# Patient Record
Sex: FEMALE | Race: WHITE | NOT HISPANIC OR LATINO | URBAN - METROPOLITAN AREA
[De-identification: names, ages, dates, MRNs, and addresses within clinical notes are randomized per-mention and may not be internally consistent; named-entity substitution may affect disease eponyms.]

---

## 2018-11-29 ENCOUNTER — EMERGENCY (EMERGENCY)
Facility: HOSPITAL | Age: 48
LOS: 0 days | Discharge: HOME | End: 2018-11-30
Attending: EMERGENCY MEDICINE | Admitting: EMERGENCY MEDICINE
Payer: COMMERCIAL

## 2018-11-29 VITALS
RESPIRATION RATE: 20 BRPM | TEMPERATURE: 98 F | SYSTOLIC BLOOD PRESSURE: 123 MMHG | HEART RATE: 69 BPM | DIASTOLIC BLOOD PRESSURE: 75 MMHG | WEIGHT: 130.07 LBS | OXYGEN SATURATION: 100 % | HEIGHT: 65 IN
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
## 2018-11-29 DIAGNOSIS — Z98.890 OTHER SPECIFIED POSTPROCEDURAL STATES: Chronic | ICD-10-CM
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
## 2018-11-29 DIAGNOSIS — T54.2X1A TOXIC EFFECT OF CORROSIVE ACIDS AND ACID-LIKE SUBSTANCES, ACCIDENTAL (UNINTENTIONAL), INITIAL ENCOUNTER: ICD-10-CM
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
## 2018-11-29 DIAGNOSIS — R07.9 CHEST PAIN, UNSPECIFIED: ICD-10-CM

## 2018-11-29 LAB
ALBUMIN SERPL ELPH-MCNC: 3.5 G/DL — SIGNIFICANT CHANGE UP (ref 3.3–5)
ALP SERPL-CCNC: 39 U/L — LOW (ref 40–120)
ALT FLD-CCNC: 24 U/L — SIGNIFICANT CHANGE UP (ref 12–78)
ANION GAP SERPL CALC-SCNC: 9 MMOL/L — SIGNIFICANT CHANGE UP (ref 5–17)
APTT BLD: 27.7 SEC — SIGNIFICANT CHANGE UP (ref 27.5–36.3)
AST SERPL-CCNC: 28 U/L — SIGNIFICANT CHANGE UP (ref 15–37)
BILIRUB SERPL-MCNC: 0.4 MG/DL — SIGNIFICANT CHANGE UP (ref 0.2–1.2)
BUN SERPL-MCNC: 12 MG/DL — SIGNIFICANT CHANGE UP (ref 7–23)
CALCIUM SERPL-MCNC: 8.6 MG/DL — SIGNIFICANT CHANGE UP (ref 8.5–10.1)
CHLORIDE SERPL-SCNC: 106 MMOL/L — SIGNIFICANT CHANGE UP (ref 96–108)
CK SERPL-CCNC: 174 U/L — SIGNIFICANT CHANGE UP (ref 26–192)
CO2 SERPL-SCNC: 25 MMOL/L — SIGNIFICANT CHANGE UP (ref 22–31)
CREAT SERPL-MCNC: 1.05 MG/DL — SIGNIFICANT CHANGE UP (ref 0.5–1.3)
D DIMER BLD IA.RAPID-MCNC: 200 NG/ML DDU — SIGNIFICANT CHANGE UP
GLUCOSE SERPL-MCNC: 101 MG/DL — HIGH (ref 70–99)
HCT VFR BLD CALC: 35.8 % — SIGNIFICANT CHANGE UP (ref 34.5–45)
HGB BLD-MCNC: 11.8 G/DL — SIGNIFICANT CHANGE UP (ref 11.5–15.5)
INR BLD: 1.05 RATIO — SIGNIFICANT CHANGE UP (ref 0.88–1.16)
LIDOCAIN IGE QN: 483 U/L — HIGH (ref 73–393)
MAGNESIUM SERPL-MCNC: 2 MG/DL — SIGNIFICANT CHANGE UP (ref 1.6–2.6)
MCHC RBC-ENTMCNC: 28.2 PG — SIGNIFICANT CHANGE UP (ref 27–34)
MCHC RBC-ENTMCNC: 33 GM/DL — SIGNIFICANT CHANGE UP (ref 32–36)
MCV RBC AUTO: 85.6 FL — SIGNIFICANT CHANGE UP (ref 80–100)
NRBC # BLD: 0 /100 WBCS — SIGNIFICANT CHANGE UP (ref 0–0)
PLATELET # BLD AUTO: 223 K/UL — SIGNIFICANT CHANGE UP (ref 150–400)
POTASSIUM SERPL-MCNC: 3.6 MMOL/L — SIGNIFICANT CHANGE UP (ref 3.5–5.3)
POTASSIUM SERPL-SCNC: 3.6 MMOL/L — SIGNIFICANT CHANGE UP (ref 3.5–5.3)
PROT SERPL-MCNC: 6.8 GM/DL — SIGNIFICANT CHANGE UP (ref 6–8.3)
PROTHROM AB SERPL-ACNC: 11.7 SEC — SIGNIFICANT CHANGE UP (ref 10–12.9)
RBC # BLD: 4.18 M/UL — SIGNIFICANT CHANGE UP (ref 3.8–5.2)
RBC # FLD: 12.6 % — SIGNIFICANT CHANGE UP (ref 10.3–14.5)
SODIUM SERPL-SCNC: 140 MMOL/L — SIGNIFICANT CHANGE UP (ref 135–145)
TROPONIN I SERPL-MCNC: <0.015 NG/ML — SIGNIFICANT CHANGE UP (ref 0.01–0.04)
WBC # BLD: 5.95 K/UL — SIGNIFICANT CHANGE UP (ref 3.8–10.5)
WBC # FLD AUTO: 5.95 K/UL — SIGNIFICANT CHANGE UP (ref 3.8–10.5)

## 2018-11-29 PROCEDURE — 99284 EMERGENCY DEPT VISIT MOD MDM: CPT | Mod: 25

## 2018-11-29 PROCEDURE — 93010 ELECTROCARDIOGRAM REPORT: CPT

## 2018-11-29 PROCEDURE — 71045 X-RAY EXAM CHEST 1 VIEW: CPT | Mod: 26

## 2018-11-29 RX ORDER — ASPIRIN/CALCIUM CARB/MAGNESIUM 324 MG
325 TABLET ORAL ONCE
Qty: 0 | Refills: 0 | Status: COMPLETED | OUTPATIENT
Start: 2018-11-29 | End: 2018-11-29

## 2018-11-29 RX ORDER — SODIUM CHLORIDE 9 MG/ML
2000 INJECTION INTRAMUSCULAR; INTRAVENOUS; SUBCUTANEOUS ONCE
Qty: 0 | Refills: 0 | Status: COMPLETED | OUTPATIENT
Start: 2018-11-29 | End: 2018-11-29

## 2018-11-29 RX ORDER — IPRATROPIUM/ALBUTEROL SULFATE 18-103MCG
3 AEROSOL WITH ADAPTER (GRAM) INHALATION ONCE
Qty: 0 | Refills: 0 | Status: COMPLETED | OUTPATIENT
Start: 2018-11-29 | End: 2018-11-29

## 2018-11-29 RX ADMIN — Medication 3 MILLILITER(S): at 23:31

## 2018-11-29 RX ADMIN — Medication 325 MILLIGRAM(S): at 23:23

## 2018-11-29 NOTE — ED ADULT NURSE NOTE - OBJECTIVE STATEMENT
Pt presents to ED complaining of burning in chest after inhaling sulfuric acid cleaning product at approx. 7:30pm.  Pt denied chest pain after inhalation but as night progressed she states her chest became tight.  Denies difficulty breathing, nonproductive cough noted.

## 2018-11-29 NOTE — ED ADULT NURSE NOTE - NSIMPLEMENTINTERV_GEN_ALL_ED
Implemented All Universal Safety Interventions:  [place] to call system. Call bell, personal items and telephone within reach. Instruct patient to call for assistance. Room bathroom lighting operational. Non-slip footwear when patient is off stretcher. Physically safe environment: no spills, clutter or unnecessary equipment. Stretcher in lowest position, wheels locked, appropriate side rails in place.

## 2018-11-29 NOTE — ED PROVIDER NOTE - MEDICAL DECISION MAKING DETAILS
Likely inhalation injury.  Will send labs and give aspirin, get EKG and give duoneb,  If labs negative, pt. can likely go home to f/u with PMD in 1-2 days.

## 2018-11-29 NOTE — ED PROVIDER NOTE - OBJECTIVE STATEMENT
Pt. is a 47 yo F cleaning bottle of sulfonic acid; sneezed, now burning in mid chest. Pt. is a 47 yo F with hx of knee surgery, cardiac arrythmia years ago presents after cleaning outside of bottle of sulfamic acid (Peng ETCH drylock) which caused sneezing and chest pain.  This chemical was used earlier in her basement to clean off the walls. Accidentally used same wet tissue that cleaned outside of bottle to wipe and blow her nose as it was running.   Pt. then began to sneeze, cough, and having burning in nose and chest.  Pt. now c/o pressure in center of chest and feeling of difficulty breathing.  She denies fever.  Has had URI and cough for a few days.  Denies back pain, vomiting, tearing, increased saliva, nausea or headache.   also in same room without any symptoms.

## 2018-11-29 NOTE — ED PROVIDER NOTE - PROGRESS NOTE DETAILS
patients symptoms improved.  2nd trop negative.  After IVFs lipase slightly improved but patient to bring copy of labs to her PMD.

## 2018-11-29 NOTE — ED ADULT TRIAGE NOTE - CHIEF COMPLAINT QUOTE
pt accidentally inhaled "sulfamic acid." pt was doing housework and blew her nose into tissue with the acid on it. complaining of chest burning.

## 2018-11-30 VITALS
HEART RATE: 70 BPM | SYSTOLIC BLOOD PRESSURE: 105 MMHG | RESPIRATION RATE: 18 BRPM | DIASTOLIC BLOOD PRESSURE: 77 MMHG | OXYGEN SATURATION: 100 %

## 2018-11-30 LAB
LIDOCAIN IGE QN: 452 U/L — HIGH (ref 73–393)
TROPONIN I SERPL-MCNC: <0.015 NG/ML — SIGNIFICANT CHANGE UP (ref 0.01–0.04)

## 2018-11-30 RX ADMIN — SODIUM CHLORIDE 2000 MILLILITER(S): 9 INJECTION INTRAMUSCULAR; INTRAVENOUS; SUBCUTANEOUS at 00:20

## 2022-06-23 PROBLEM — Z00.00 ENCOUNTER FOR PREVENTIVE HEALTH EXAMINATION: Status: ACTIVE | Noted: 2022-06-23

## 2022-08-08 ENCOUNTER — EMERGENCY (EMERGENCY)
Facility: HOSPITAL | Age: 52
LOS: 0 days | Discharge: HOME | End: 2022-08-08
Attending: EMERGENCY MEDICINE
Payer: COMMERCIAL

## 2022-08-08 VITALS
SYSTOLIC BLOOD PRESSURE: 108 MMHG | DIASTOLIC BLOOD PRESSURE: 71 MMHG | RESPIRATION RATE: 18 BRPM | HEART RATE: 54 BPM | OXYGEN SATURATION: 100 % | TEMPERATURE: 98 F

## 2022-08-08 VITALS — WEIGHT: 130.07 LBS | HEIGHT: 65 IN

## 2022-08-08 DIAGNOSIS — Z98.890 OTHER SPECIFIED POSTPROCEDURAL STATES: Chronic | ICD-10-CM

## 2022-08-08 DIAGNOSIS — R00.2 PALPITATIONS: ICD-10-CM

## 2022-08-08 DIAGNOSIS — R79.1 ABNORMAL COAGULATION PROFILE: ICD-10-CM

## 2022-08-08 DIAGNOSIS — Z20.822 CONTACT WITH AND (SUSPECTED) EXPOSURE TO COVID-19: ICD-10-CM

## 2022-08-08 DIAGNOSIS — R91.1 SOLITARY PULMONARY NODULE: ICD-10-CM

## 2022-08-08 PROBLEM — Z86.79 PERSONAL HISTORY OF OTHER DISEASES OF THE CIRCULATORY SYSTEM: Chronic | Status: ACTIVE | Noted: 2018-11-29

## 2022-08-08 LAB
ALBUMIN SERPL ELPH-MCNC: 3.5 G/DL — SIGNIFICANT CHANGE UP (ref 3.3–5)
ALP SERPL-CCNC: 41 U/L — SIGNIFICANT CHANGE UP (ref 40–120)
ALT FLD-CCNC: 35 U/L — SIGNIFICANT CHANGE UP (ref 12–78)
ANION GAP SERPL CALC-SCNC: 5 MMOL/L — SIGNIFICANT CHANGE UP (ref 5–17)
APTT BLD: 27.3 SEC — LOW (ref 27.5–35.5)
AST SERPL-CCNC: 26 U/L — SIGNIFICANT CHANGE UP (ref 15–37)
BASOPHILS # BLD AUTO: 0.06 K/UL — SIGNIFICANT CHANGE UP (ref 0–0.2)
BASOPHILS NFR BLD AUTO: 1.4 % — SIGNIFICANT CHANGE UP (ref 0–2)
BILIRUB SERPL-MCNC: 0.5 MG/DL — SIGNIFICANT CHANGE UP (ref 0.2–1.2)
BUN SERPL-MCNC: 11 MG/DL — SIGNIFICANT CHANGE UP (ref 7–23)
CALCIUM SERPL-MCNC: 9.3 MG/DL — SIGNIFICANT CHANGE UP (ref 8.5–10.1)
CHLORIDE SERPL-SCNC: 109 MMOL/L — HIGH (ref 96–108)
CO2 SERPL-SCNC: 26 MMOL/L — SIGNIFICANT CHANGE UP (ref 22–31)
CREAT SERPL-MCNC: 0.96 MG/DL — SIGNIFICANT CHANGE UP (ref 0.5–1.3)
D DIMER BLD IA.RAPID-MCNC: 309 NG/ML DDU — HIGH
EGFR: 71 ML/MIN/1.73M2 — SIGNIFICANT CHANGE UP
EOSINOPHIL # BLD AUTO: 0.09 K/UL — SIGNIFICANT CHANGE UP (ref 0–0.5)
EOSINOPHIL NFR BLD AUTO: 2.1 % — SIGNIFICANT CHANGE UP (ref 0–6)
GLUCOSE SERPL-MCNC: 85 MG/DL — SIGNIFICANT CHANGE UP (ref 70–99)
HCT VFR BLD CALC: 39 % — SIGNIFICANT CHANGE UP (ref 34.5–45)
HGB BLD-MCNC: 12.7 G/DL — SIGNIFICANT CHANGE UP (ref 11.5–15.5)
IMM GRANULOCYTES NFR BLD AUTO: 0 % — SIGNIFICANT CHANGE UP (ref 0–1.5)
INR BLD: 1.03 RATIO — SIGNIFICANT CHANGE UP (ref 0.88–1.16)
LYMPHOCYTES # BLD AUTO: 1.72 K/UL — SIGNIFICANT CHANGE UP (ref 1–3.3)
LYMPHOCYTES # BLD AUTO: 39.8 % — SIGNIFICANT CHANGE UP (ref 13–44)
MCHC RBC-ENTMCNC: 27.3 PG — SIGNIFICANT CHANGE UP (ref 27–34)
MCHC RBC-ENTMCNC: 32.6 GM/DL — SIGNIFICANT CHANGE UP (ref 32–36)
MCV RBC AUTO: 83.7 FL — SIGNIFICANT CHANGE UP (ref 80–100)
MONOCYTES # BLD AUTO: 0.51 K/UL — SIGNIFICANT CHANGE UP (ref 0–0.9)
MONOCYTES NFR BLD AUTO: 11.8 % — SIGNIFICANT CHANGE UP (ref 2–14)
NEUTROPHILS # BLD AUTO: 1.94 K/UL — SIGNIFICANT CHANGE UP (ref 1.8–7.4)
NEUTROPHILS NFR BLD AUTO: 44.9 % — SIGNIFICANT CHANGE UP (ref 43–77)
PLATELET # BLD AUTO: 213 K/UL — SIGNIFICANT CHANGE UP (ref 150–400)
POTASSIUM SERPL-MCNC: 3.6 MMOL/L — SIGNIFICANT CHANGE UP (ref 3.5–5.3)
POTASSIUM SERPL-SCNC: 3.6 MMOL/L — SIGNIFICANT CHANGE UP (ref 3.5–5.3)
PROT SERPL-MCNC: 7.3 GM/DL — SIGNIFICANT CHANGE UP (ref 6–8.3)
PROTHROM AB SERPL-ACNC: 11.9 SEC — SIGNIFICANT CHANGE UP (ref 10.5–13.4)
RBC # BLD: 4.66 M/UL — SIGNIFICANT CHANGE UP (ref 3.8–5.2)
RBC # FLD: 13.2 % — SIGNIFICANT CHANGE UP (ref 10.3–14.5)
SARS-COV-2 RNA SPEC QL NAA+PROBE: SIGNIFICANT CHANGE UP
SODIUM SERPL-SCNC: 140 MMOL/L — SIGNIFICANT CHANGE UP (ref 135–145)
TROPONIN I, HIGH SENSITIVITY RESULT: 4.06 NG/L — SIGNIFICANT CHANGE UP
WBC # BLD: 4.32 K/UL — SIGNIFICANT CHANGE UP (ref 3.8–10.5)
WBC # FLD AUTO: 4.32 K/UL — SIGNIFICANT CHANGE UP (ref 3.8–10.5)

## 2022-08-08 PROCEDURE — 85379 FIBRIN DEGRADATION QUANT: CPT

## 2022-08-08 PROCEDURE — 93970 EXTREMITY STUDY: CPT

## 2022-08-08 PROCEDURE — 80053 COMPREHEN METABOLIC PANEL: CPT

## 2022-08-08 PROCEDURE — 93010 ELECTROCARDIOGRAM REPORT: CPT

## 2022-08-08 PROCEDURE — U0005: CPT

## 2022-08-08 PROCEDURE — 99285 EMERGENCY DEPT VISIT HI MDM: CPT

## 2022-08-08 PROCEDURE — G1004: CPT

## 2022-08-08 PROCEDURE — 71275 CT ANGIOGRAPHY CHEST: CPT | Mod: 26,ME

## 2022-08-08 PROCEDURE — U0003: CPT

## 2022-08-08 PROCEDURE — 36415 COLL VENOUS BLD VENIPUNCTURE: CPT

## 2022-08-08 PROCEDURE — 93005 ELECTROCARDIOGRAM TRACING: CPT

## 2022-08-08 PROCEDURE — 85025 COMPLETE CBC W/AUTO DIFF WBC: CPT

## 2022-08-08 PROCEDURE — 85610 PROTHROMBIN TIME: CPT

## 2022-08-08 PROCEDURE — 84484 ASSAY OF TROPONIN QUANT: CPT

## 2022-08-08 PROCEDURE — 71275 CT ANGIOGRAPHY CHEST: CPT | Mod: ME

## 2022-08-08 PROCEDURE — 85730 THROMBOPLASTIN TIME PARTIAL: CPT

## 2022-08-08 PROCEDURE — 93970 EXTREMITY STUDY: CPT | Mod: 26

## 2022-08-08 PROCEDURE — 99285 EMERGENCY DEPT VISIT HI MDM: CPT | Mod: 25

## 2022-08-08 RX ORDER — SODIUM CHLORIDE 9 MG/ML
1000 INJECTION INTRAMUSCULAR; INTRAVENOUS; SUBCUTANEOUS ONCE
Refills: 0 | Status: COMPLETED | OUTPATIENT
Start: 2022-08-08 | End: 2022-08-08

## 2022-08-08 RX ADMIN — SODIUM CHLORIDE 1000 MILLILITER(S): 9 INJECTION INTRAMUSCULAR; INTRAVENOUS; SUBCUTANEOUS at 14:09

## 2022-08-08 NOTE — ED ADULT TRIAGE NOTE - CHIEF COMPLAINT QUOTE
p/w irregular HR and heart palpitations, pt. states that baseline HR is in 50s. but now fluctuates between 50s and 80bpt at rest. recent travel from [place]. sent in by Cardiologist to r/o DVT. pt. also had fevers that resolved, negative COVID swab.

## 2022-08-08 NOTE — ED STATDOCS - ATTENDING APP SHARED VISIT CONTRIBUTION OF CARE
I personally saw the patient with the YARON, and completed the key components of the history and physical exam. I then discussed the management plan with the YARON.

## 2022-08-08 NOTE — ED ADULT NURSE NOTE - OBJECTIVE STATEMENT
pt is 53 yo female presents to ED c/o palpitations, pt c/o chest pain, denies sob, weakness, or leg pain or swelling.

## 2022-08-08 NOTE — ED STATDOCS - OBJECTIVE STATEMENT
51 y/o female with a PMHx of cardiac arrhythmia presents to the ED c/o palpitations. Pt states her resting HR is in the 50s and is now in the 80s. Recent travel from [place]. Pt sent by her cardiologist to r/o DVT. No other complaints at this time.

## 2022-08-08 NOTE — ED STATDOCS - NS ED ATTENDING STATEMENT MOD
This was a shared visit with the YARON. I reviewed and verified the documentation and independently performed the documented:

## 2022-08-08 NOTE — ED STATDOCS - PATIENT PORTAL LINK FT
You can access the FollowMyHealth Patient Portal offered by [facility] by registering at the following website: [email or web address]. By joining [employer]’s FollowMyHealth portal, you will also be able to view your health information using other applications (apps) compatible with our system.

## 2022-08-08 NOTE — ED STATDOCS - PROGRESS NOTE DETAILS
Patient initially seen and evaluated with ED attending at intake.  REporting concern for DVT/PE.  ddimer elevated so CT and dopplers ordered.  No acute findings, incidental of lung nodules reviewed and she will follow up pmd.  copies of results provided.  Patient requesting covid test.  Reviewed PMD/cardiology follow up and return precautions -Denise Sunshine PA-C

## 2022-08-08 NOTE — ED STATDOCS - CLINICAL SUMMARY MEDICAL DECISION MAKING FREE TEXT BOX
Pt with palpitations s/p trip to [place]. Pt sent by MD to r/o DVT. Will get labs, EKG. If workup normal, pt can be d/c to follow up.

## 2022-12-21 NOTE — ED ADULT TRIAGE NOTE - WEIGHT METHOD
stated MD, RN, RT, EDT/Cardiac Monitor/Defib/ACLS/Rescue Kit/O2/BVM/oxygen/IV pump/pulse ox/ventilator/ACLS Rescue Kit

## 2023-05-19 ENCOUNTER — TRANSCRIPTION ENCOUNTER (OUTPATIENT)
Age: 53
End: 2023-05-19

## 2023-05-19 ENCOUNTER — INPATIENT (INPATIENT)
Facility: HOSPITAL | Age: 53
LOS: 2 days | Discharge: HOME | DRG: 93 | End: 2023-05-22
Attending: STUDENT IN AN ORGANIZED HEALTH CARE EDUCATION/TRAINING PROGRAM | Admitting: INTERNAL MEDICINE
Payer: COMMERCIAL

## 2023-05-19 VITALS
RESPIRATION RATE: 20 BRPM | SYSTOLIC BLOOD PRESSURE: 111 MMHG | DIASTOLIC BLOOD PRESSURE: 67 MMHG | TEMPERATURE: 98 F | HEIGHT: 63 IN | WEIGHT: 149.91 LBS | OXYGEN SATURATION: 100 % | HEART RATE: 56 BPM

## 2023-05-19 DIAGNOSIS — Z98.890 OTHER SPECIFIED POSTPROCEDURAL STATES: Chronic | ICD-10-CM

## 2023-05-19 DIAGNOSIS — R55 SYNCOPE AND COLLAPSE: ICD-10-CM

## 2023-05-19 LAB
ABO RH CONFIRMATION: SIGNIFICANT CHANGE UP
ADD ON TEST-SPECIMEN IN LAB: SIGNIFICANT CHANGE UP
ALBUMIN SERPL ELPH-MCNC: 3.7 G/DL — SIGNIFICANT CHANGE UP (ref 3.3–5)
ALP SERPL-CCNC: 42 U/L — SIGNIFICANT CHANGE UP (ref 40–120)
ALT FLD-CCNC: 33 U/L — SIGNIFICANT CHANGE UP (ref 12–78)
ANION GAP SERPL CALC-SCNC: 11 MMOL/L — SIGNIFICANT CHANGE UP (ref 5–17)
APPEARANCE UR: CLEAR — SIGNIFICANT CHANGE UP
APTT BLD: 25.2 SEC — LOW (ref 27.5–35.5)
AST SERPL-CCNC: 22 U/L — SIGNIFICANT CHANGE UP (ref 15–37)
BASOPHILS # BLD AUTO: 0.07 K/UL — SIGNIFICANT CHANGE UP (ref 0–0.2)
BASOPHILS NFR BLD AUTO: 1 % — SIGNIFICANT CHANGE UP (ref 0–2)
BILIRUB SERPL-MCNC: 0.6 MG/DL — SIGNIFICANT CHANGE UP (ref 0.2–1.2)
BILIRUB UR-MCNC: NEGATIVE — SIGNIFICANT CHANGE UP
BLD GP AB SCN SERPL QL: SIGNIFICANT CHANGE UP
BUN SERPL-MCNC: 16 MG/DL — SIGNIFICANT CHANGE UP (ref 7–23)
CALCIUM SERPL-MCNC: 9.1 MG/DL — SIGNIFICANT CHANGE UP (ref 8.5–10.1)
CHLORIDE SERPL-SCNC: 108 MMOL/L — SIGNIFICANT CHANGE UP (ref 96–108)
CO2 SERPL-SCNC: 22 MMOL/L — SIGNIFICANT CHANGE UP (ref 22–31)
COLOR SPEC: YELLOW — SIGNIFICANT CHANGE UP
CREAT SERPL-MCNC: 0.86 MG/DL — SIGNIFICANT CHANGE UP (ref 0.5–1.3)
DIFF PNL FLD: NEGATIVE — SIGNIFICANT CHANGE UP
EGFR: 80 ML/MIN/1.73M2 — SIGNIFICANT CHANGE UP
ELLIPTOCYTES BLD QL SMEAR: SLIGHT — SIGNIFICANT CHANGE UP
EOSINOPHIL # BLD AUTO: 0 K/UL — SIGNIFICANT CHANGE UP (ref 0–0.5)
EOSINOPHIL NFR BLD AUTO: 0 % — SIGNIFICANT CHANGE UP (ref 0–6)
GLUCOSE SERPL-MCNC: 121 MG/DL — HIGH (ref 70–99)
GLUCOSE UR QL: NEGATIVE — SIGNIFICANT CHANGE UP
HCT VFR BLD CALC: 39.7 % — SIGNIFICANT CHANGE UP (ref 34.5–45)
HGB BLD-MCNC: 13 G/DL — SIGNIFICANT CHANGE UP (ref 11.5–15.5)
INR BLD: 0.99 RATIO — SIGNIFICANT CHANGE UP (ref 0.88–1.16)
KETONES UR-MCNC: ABNORMAL
LEUKOCYTE ESTERASE UR-ACNC: NEGATIVE — SIGNIFICANT CHANGE UP
LIDOCAIN IGE QN: 209 U/L — SIGNIFICANT CHANGE UP (ref 73–393)
LYMPHOCYTES # BLD AUTO: 0.4 K/UL — LOW (ref 1–3.3)
LYMPHOCYTES # BLD AUTO: 6 % — LOW (ref 13–44)
MAGNESIUM SERPL-MCNC: 2 MG/DL — SIGNIFICANT CHANGE UP (ref 1.6–2.6)
MANUAL SMEAR VERIFICATION: SIGNIFICANT CHANGE UP
MCHC RBC-ENTMCNC: 27.7 PG — SIGNIFICANT CHANGE UP (ref 27–34)
MCHC RBC-ENTMCNC: 32.7 GM/DL — SIGNIFICANT CHANGE UP (ref 32–36)
MCV RBC AUTO: 84.6 FL — SIGNIFICANT CHANGE UP (ref 80–100)
MONOCYTES # BLD AUTO: 0 K/UL — SIGNIFICANT CHANGE UP (ref 0–0.9)
MONOCYTES NFR BLD AUTO: 0 % — LOW (ref 2–14)
NEUTROPHILS # BLD AUTO: 6.09 K/UL — SIGNIFICANT CHANGE UP (ref 1.8–7.4)
NEUTROPHILS NFR BLD AUTO: 91 % — HIGH (ref 43–77)
NITRITE UR-MCNC: NEGATIVE — SIGNIFICANT CHANGE UP
NRBC # BLD: 0 /100 — SIGNIFICANT CHANGE UP (ref 0–0)
NRBC # BLD: SIGNIFICANT CHANGE UP /100 WBCS (ref 0–0)
OVALOCYTES BLD QL SMEAR: SLIGHT — SIGNIFICANT CHANGE UP
PH UR: 7 — SIGNIFICANT CHANGE UP (ref 5–8)
PHOSPHATE SERPL-MCNC: 3.2 MG/DL — SIGNIFICANT CHANGE UP (ref 2.5–4.5)
PLAT MORPH BLD: NORMAL — SIGNIFICANT CHANGE UP
PLATELET # BLD AUTO: 220 K/UL — SIGNIFICANT CHANGE UP (ref 150–400)
POIKILOCYTOSIS BLD QL AUTO: SLIGHT — SIGNIFICANT CHANGE UP
POTASSIUM SERPL-MCNC: 3.8 MMOL/L — SIGNIFICANT CHANGE UP (ref 3.5–5.3)
POTASSIUM SERPL-SCNC: 3.8 MMOL/L — SIGNIFICANT CHANGE UP (ref 3.5–5.3)
PROT SERPL-MCNC: 7.5 GM/DL — SIGNIFICANT CHANGE UP (ref 6–8.3)
PROT UR-MCNC: NEGATIVE — SIGNIFICANT CHANGE UP
PROTHROM AB SERPL-ACNC: 11.5 SEC — SIGNIFICANT CHANGE UP (ref 10.5–13.4)
RBC # BLD: 4.69 M/UL — SIGNIFICANT CHANGE UP (ref 3.8–5.2)
RBC # FLD: 13.9 % — SIGNIFICANT CHANGE UP (ref 10.3–14.5)
RBC BLD AUTO: ABNORMAL
SCHISTOCYTES BLD QL AUTO: SLIGHT — SIGNIFICANT CHANGE UP
SODIUM SERPL-SCNC: 141 MMOL/L — SIGNIFICANT CHANGE UP (ref 135–145)
SP GR SPEC: 1.01 — SIGNIFICANT CHANGE UP (ref 1.01–1.02)
TROPONIN I, HIGH SENSITIVITY RESULT: 3.24 NG/L — SIGNIFICANT CHANGE UP
TROPONIN I, HIGH SENSITIVITY RESULT: 3.28 NG/L — SIGNIFICANT CHANGE UP
UROBILINOGEN FLD QL: NEGATIVE — SIGNIFICANT CHANGE UP
VARIANT LYMPHS # BLD: 2 % — SIGNIFICANT CHANGE UP (ref 0–6)
WBC # BLD: 6.69 K/UL — SIGNIFICANT CHANGE UP (ref 3.8–10.5)
WBC # FLD AUTO: 6.69 K/UL — SIGNIFICANT CHANGE UP (ref 3.8–10.5)

## 2023-05-19 PROCEDURE — 85027 COMPLETE CBC AUTOMATED: CPT

## 2023-05-19 PROCEDURE — 71045 X-RAY EXAM CHEST 1 VIEW: CPT

## 2023-05-19 PROCEDURE — 36415 COLL VENOUS BLD VENIPUNCTURE: CPT

## 2023-05-19 PROCEDURE — 85379 FIBRIN DEGRADATION QUANT: CPT

## 2023-05-19 PROCEDURE — 84484 ASSAY OF TROPONIN QUANT: CPT

## 2023-05-19 PROCEDURE — 87086 URINE CULTURE/COLONY COUNT: CPT

## 2023-05-19 PROCEDURE — 84100 ASSAY OF PHOSPHORUS: CPT

## 2023-05-19 PROCEDURE — 83735 ASSAY OF MAGNESIUM: CPT

## 2023-05-19 PROCEDURE — 87635 SARS-COV-2 COVID-19 AMP PRB: CPT

## 2023-05-19 PROCEDURE — 93005 ELECTROCARDIOGRAM TRACING: CPT

## 2023-05-19 PROCEDURE — 70551 MRI BRAIN STEM W/O DYE: CPT

## 2023-05-19 PROCEDURE — 82533 TOTAL CORTISOL: CPT

## 2023-05-19 PROCEDURE — 80048 BASIC METABOLIC PNL TOTAL CA: CPT

## 2023-05-19 PROCEDURE — G0378: CPT

## 2023-05-19 PROCEDURE — 97162 PT EVAL MOD COMPLEX 30 MIN: CPT | Mod: GP

## 2023-05-19 PROCEDURE — 76705 ECHO EXAM OF ABDOMEN: CPT | Mod: 26

## 2023-05-19 PROCEDURE — 99285 EMERGENCY DEPT VISIT HI MDM: CPT

## 2023-05-19 PROCEDURE — 81003 URINALYSIS AUTO W/O SCOPE: CPT

## 2023-05-19 PROCEDURE — 71045 X-RAY EXAM CHEST 1 VIEW: CPT | Mod: 26

## 2023-05-19 PROCEDURE — 93306 TTE W/DOPPLER COMPLETE: CPT

## 2023-05-19 PROCEDURE — 99222 1ST HOSP IP/OBS MODERATE 55: CPT

## 2023-05-19 PROCEDURE — 93970 EXTREMITY STUDY: CPT

## 2023-05-19 PROCEDURE — 70450 CT HEAD/BRAIN W/O DYE: CPT | Mod: 26,MA

## 2023-05-19 PROCEDURE — 97116 GAIT TRAINING THERAPY: CPT | Mod: GP

## 2023-05-19 RX ORDER — SODIUM CHLORIDE 9 MG/ML
1000 INJECTION INTRAMUSCULAR; INTRAVENOUS; SUBCUTANEOUS ONCE
Refills: 0 | Status: COMPLETED | OUTPATIENT
Start: 2023-05-19 | End: 2023-05-19

## 2023-05-19 RX ORDER — MECLIZINE HCL 12.5 MG
25 TABLET ORAL ONCE
Refills: 0 | Status: COMPLETED | OUTPATIENT
Start: 2023-05-19 | End: 2023-05-19

## 2023-05-19 RX ORDER — SODIUM CHLORIDE 9 MG/ML
1000 INJECTION, SOLUTION INTRAVENOUS ONCE
Refills: 0 | Status: COMPLETED | OUTPATIENT
Start: 2023-05-19 | End: 2023-05-19

## 2023-05-19 RX ORDER — ONDANSETRON 8 MG/1
4 TABLET, FILM COATED ORAL ONCE
Refills: 0 | Status: COMPLETED | OUTPATIENT
Start: 2023-05-19 | End: 2023-05-19

## 2023-05-19 RX ORDER — ONDANSETRON 8 MG/1
4 TABLET, FILM COATED ORAL EVERY 6 HOURS
Refills: 0 | Status: DISCONTINUED | OUTPATIENT
Start: 2023-05-19 | End: 2023-05-22

## 2023-05-19 RX ADMIN — SODIUM CHLORIDE 1000 MILLILITER(S): 9 INJECTION, SOLUTION INTRAVENOUS at 14:50

## 2023-05-19 RX ADMIN — SODIUM CHLORIDE 2000 MILLILITER(S): 9 INJECTION INTRAMUSCULAR; INTRAVENOUS; SUBCUTANEOUS at 12:07

## 2023-05-19 RX ADMIN — SODIUM CHLORIDE 1000 MILLILITER(S): 9 INJECTION INTRAMUSCULAR; INTRAVENOUS; SUBCUTANEOUS at 13:07

## 2023-05-19 RX ADMIN — ONDANSETRON 4 MILLIGRAM(S): 8 TABLET, FILM COATED ORAL at 13:20

## 2023-05-19 RX ADMIN — Medication 25 MILLIGRAM(S): at 19:48

## 2023-05-19 NOTE — H&P ADULT - NSHPLABSRESULTS_GEN_ALL_CORE
my interpretation    EKG:  NSR 63 bpm,  Left Postr Fascic block,  T wave inv in I, avl        last Echo  Dec 2022 at [facility]        RUQ  US abd  FINDINGS:  The hepatic parenchyma appears mildly echogenic. There is mild   intrahepatic biliary ductal dilatation.    No pericholecystic fluid or wall thickening is seen. There is   cholelithiasis. Sonographic Parker's sign was reportedly negative.    The common duct measures 7 mm in diameter.    Partially visualized pancreas appears unremarkable without peripancreatic   fluid.    Right kidney is unremarkable with the exception of a 1.7 cm cyst at the   upper pole.    Partially visualized right pleural effusion.    IMPRESSION:  Negative for acute cholecystitis. Cholelithiasis.    Mild intrahepatic and extrahepatic ductal dilatation is likely within   normal limits for the patient's age.  . my interpretation    EKG:  NSR 63 bpm,  Left Postr Fascic block,  T wave inv in I, avl        last Echo  Dec 2022 at [facility]        < from: US Abdomen Upper Quadrant Right (05.19.23 @ 13:06) >    ACC: [id number] EXAM:  US ABDOMEN RT UPR QUADRANT   ORDERED BY: JASON GILLESPIE     PROCEDURE DATE:  05/19/2023      INTERPRETATION:  CLINICAL INFORMATION: Right upper quadrant abdominal pain    COMPARISON: None available.    TECHNIQUE: Sonography of the right upper quadrant.    FINDINGS:  Liver: Within normal limits.  Bile ducts: Normal caliber. Common bile duct measures 4 mm.  Gallbladder: Within normal limits. No stones or gallbladder wall   thickening. No sonographic Parker's sign.  Pancreas: Visualized portions are within normal limits.  Right kidney: 10.2 cm. No hydronephrosis.  Ascites: None.  IVC: Visualized portions are within normal limits.    IMPRESSION:  Normal right upper quadrant abdominal ultrasound.  --- End of Report ---  LEV MODI MD; Attending Radiologist  This document has been electronically signed. May 19 2023  1:08PM  < end of copied text >

## 2023-05-19 NOTE — H&P ADULT - HISTORY OF PRESENT ILLNESS
54 y/o female with PMHx of CMT1, dysautonomia, and arrhythmia (PVCs) BIBEMS to ED s/p witnessed syncopal event. Pt reports she woke up at 5 AM with severe right periscapular pain that has been on/off for a week, but never this severe.  helped her to the bathroom, pt urinated and defecated when she suddenly felt nauseated. Pt then slumped over while on the toilet, + LOC and landed on forehead and right arm. Pt was unresponsive,  called EMS who performed evaluation that was unremarkable and pt refused to seek medical attention.  reports subsequent episode of pt feeling lightheaded and nauseated again, unable to lift head or ambulate on her own. + RUQ discomfort. Notes Hx of elevated pancreatic enzymes. Is currently on ketogenic diet for treatment of dysautonomia. Not on AC. Denies Hx of abdominal surgery.     Pt is a pleasant  52 y/o female with PMHx of Charcot- Yusra -Tooth Disease 1, Dysautonomia,  arrhythmia (PVCs), Esophageal stricture and balloon dilatation, reported inability to swallow pills, elevated pancreatic enzymes currently on ketogenic diet for treatment of dysautonomia, who has been under work-up for tunnel vision and blurry vision. Pt presented via ambulance to [facility] ED after two syncopal events.    Pt reports having severe right chest and shoulder pain that  woke her up at 5 AM.    She went to the bathroom, where she urinated and had a bowel movement.  Pt reports  she suddenly felt nauseous  and her  reports she then slumped over and had  LOC while on the toilet.  She  landed on her forehead and right arm.   Pt later felt lightheaded and nauseated , with dizziness.  She reported not being able to lift her head or walk.      Pt reported  RUQ discomfort,  She denies fever/chills,  no URI complaints.  Pt reports urinary frequency and cloudy urine.  She reports a loose stool/day due to Mag citrate intake.  She reports orange stool while on a current ketogenic diet.  Pt states she had a Strep B urine cx for which she took oil of oregano for 7 days.   She denies sick contacts.     Pt sleeps on an angle due to her dysautonomia.

## 2023-05-19 NOTE — H&P ADULT - NSICDXFAMILYHX_GEN_ALL_CORE_FT
FAMILY HISTORY:  Father  Still living? No  Family history of diabetes mellitus (DM), Age at diagnosis: Age Unknown  Paternal family history of hypertension, Age at diagnosis: Age Unknown    Mother  Still living? No  Family history of pancreatic cancer, Age at diagnosis: 71-80

## 2023-05-19 NOTE — ED ADULT NURSE NOTE - OBJECTIVE STATEMENT
s/p witnessed syncopal event. Pt reports she woke up at 5 AM with severe right periscapular pain that has been on/off for a week, but never this severe.  helped her to the bathroom, pt urinated and defecated when she suddenly felt nauseated. Pt then slumped over while on the toilet, + LOC and landed on forehead and right arm. Pt was unresponsive,  called EMS who performed evaluation that was unremarkable and pt refused to seek medical attention.  reports subsequent episode of pt feeling lightheaded and nauseated again, unable to lift head or ambulate on her own. + RUQ discomfort. Notes Hx of elevated pancreatic enzymes. Is currently on ketogenic diet for treatment of dysautonomia

## 2023-05-19 NOTE — ED PROVIDER NOTE - OBJECTIVE STATEMENT
54 y/o female with PMHx of CMT1, dysautonomia, and arrhythmia (PVCs) BIBEMS to ED s/p witnessed syncopal event. Pt reports she woke up at 5 AM with severe right periscapular pain that has been on/off for a week, but never this severe.  helped her to the bathroom, pt urinated and defecated when she suddenly felt nauseated. Pt then slumped over while on the toilet, + LOC and landed on forehead and right arm. Pt was unresponsive,  called EMS who performed evaluation that was unremarkable and pt refused to seek medical attention.  reports subsequent episode of pt feeling lightheaded and nauseated again, unable to lift head or ambulate on her own. + RUQ discomfort. Notes Hx of elevated pancreatic enzymes. Is currently on ketogenic diet for treatment of dysautonomia. Not on AC. Denies Hx of abdominal surgery.    PCP: Dr. Walter Hernandez

## 2023-05-19 NOTE — H&P ADULT - ASSESSMENT
Pt is admitted w/    Syncope  Dizziness  R Chest pain/ Shoulder, scapula pain  Hx of Dysautonomia.   Dehydration    - admit to tele  - eval for arrythmia  - repeat troponin and EKG  - s/p IVF 1 L NS and 1 L of  LR, cont maintenance IVF x1 L   - US abd reviewed ( see results)   - s/p Meclizine 25mg , cont PRN  - cardiology consult  - physical therapy   - DVT proph; Ambulation

## 2023-05-19 NOTE — ED ADULT NURSE NOTE - CHIEF COMPLAINT QUOTE
Pt arrives to ED s/p syncope. Pt had syncopal episode this morning around 0500 while using bathroom. EMS was called but pt refused to seek medical attention. Pt had second near syncope this after noon. Complains of dizziness at this time.  by EMS.

## 2023-05-19 NOTE — ED PROVIDER NOTE - NSICDXPASTMEDICALHX_GEN_ALL_CORE_FT
PAST MEDICAL HISTORY:  H/O cardiac arrhythmia       Charcot-Yusra-Tooth disease     Dysautonomia

## 2023-05-19 NOTE — PATIENT PROFILE ADULT - FALL HARM RISK - HARM RISK INTERVENTIONS
Assistance with ambulation/Assistance OOB with selected safe patient handling equipment/Communicate Risk of Fall with Harm to all staff/Discuss with provider need for PT consult/Monitor gait and stability/Reinforce activity limits and safety measures with patient and family/Sit up slowly, dangle for a short time, stand at bedside before walking/Tailored Fall Risk Interventions/Visual Cue: Yellow wristband and red socks/Bed in lowest position, wheels locked, appropriate side rails in place/Call bell, personal items and telephone in reach/Instruct patient to call for assistance before getting out of bed or chair/Non-slip footwear when patient is out of bed/[place] to call system/Physically safe environment - no spills, clutter or unnecessary equipment/Purposeful Proactive Rounding/Room/bathroom lighting operational, light cord in reach

## 2023-05-19 NOTE — ED PROVIDER NOTE - CLINICAL SUMMARY MEDICAL DECISION MAKING FREE TEXT BOX
54 y/o pt presents to ED with syncopal episode x 2. Plan check labs, CT scan. 52 y/o pt presents to ED with syncopal episode x 2. Pt states when she changes position in stretcher, becomes more dizzy.  Plan check labs, CT scan.

## 2023-05-19 NOTE — H&P ADULT - NSHPPHYSICALEXAM_GEN_ALL_CORE
ICU Vital Signs Last 24 Hrs  T(C): 36.7 (19 May 2023 20:40), Max: 36.8 (19 May 2023 11:42)  T(F): 98 (19 May 2023 20:40), Max: 98.3 (19 May 2023 11:42)  HR: 67 (19 May 2023 20:40) (56 - 72)  BP: 111/75 (19 May 2023 20:40) (101/60 - 123/74)  BP(mean): 73 (19 May 2023 15:08) (73 - 73)    RR: 18 (19 May 2023 20:40) (16 - 20)  SpO2: 98% (19 May 2023 20:40) (98% - 100%)    O2 Parameters below as of 19 May 2023 20:40  Patient On (Oxygen Delivery Method): room air

## 2023-05-19 NOTE — H&P ADULT - NSHPOUTPATIENTPROVIDERS_GEN_ALL_CORE
PCP: Dr. Walter Hernandez PCP: Dr. Walter Hernandez  Neuro Opthalmology  : Asael Green   Neurology Dr. Rubi  [facility]

## 2023-05-19 NOTE — PHARMACOTHERAPY INTERVENTION NOTE - COMMENTS
Medication history complete, reviewed medications with patient and confirmed with doctor first med hx. Patient is not on meds at home.

## 2023-05-19 NOTE — ED ADULT NURSE NOTE - NSFALLHARMRISKINTERV_ED_ALL_ED
Assistance OOB with selected safe patient handling equipment if applicable/Assistance with ambulation/Communicate risk of Fall with Harm to all staff, patient, and family/Encourage patient to sit up slowly, dangle for a short time, stand at bedside before walking/Monitor gait and stability/Orthostatic vital signs/Provide visual cue: red socks, yellow wristband, yellow gown, etc/Reinforce activity limits and safety measures with patient and family/Bed in lowest position, wheels locked, appropriate side rails in place/Call bell, personal items and telephone in reach/Instruct patient to call for assistance before getting out of bed/chair/stretcher/Non-slip footwear applied when patient is off stretcher/[place] to call system/Physically safe environment - no spills, clutter or unnecessary equipment/Purposeful Proactive Rounding/Room/bathroom lighting operational, light cord in reach

## 2023-05-19 NOTE — ED PROVIDER NOTE - PROGRESS NOTE DETAILS
Attending Danny, d/w  and pt results of tests.  pt dizzy and not able to ambulate.  Plan admit for syncope work up

## 2023-05-19 NOTE — H&P ADULT - MUSCULOSKELETAL
normal/no calf tenderness/strength 5/5 bilateral upper extremities/strength 5/5 bilateral lower extremities

## 2023-05-19 NOTE — ED ADULT TRIAGE NOTE - CHIEF COMPLAINT QUOTE
Pt arrives to ED s/p syncope. Pt had syncopal episode this morning around 0500 while using bathroom. EMS was called but pt refused to seek medical attention. Pt had second near syncope this after noon. Complains of dizziness at this time. Pt arrives to ED s/p syncope. Pt had syncopal episode this morning around 0500 while using bathroom. EMS was called but pt refused to seek medical attention. Pt had second near syncope this after noon. Complains of dizziness at this time.  by EMS.

## 2023-05-19 NOTE — PATIENT PROFILE ADULT - SAFE PLACE TO LIVE
Appointment with Dr. Delgado on 1/6/23 will need to be rescheduled due to provider being out of the office that afternoon.     Patient can be rescheduled to next available half hour appointment on a day that has no more than one new patient appointment already scheduled. (at time of writing 1/13/23 - MWV/CPE spots can be used for this as well)     Any questions or concerns about scheduling please reach out to psr via [employer] secure chat group [place] [id number] [address] FP/IM/PEDS/OB NON CLINICAL   no

## 2023-05-20 DIAGNOSIS — R55 SYNCOPE AND COLLAPSE: ICD-10-CM

## 2023-05-20 LAB
ANION GAP SERPL CALC-SCNC: 8 MMOL/L — SIGNIFICANT CHANGE UP (ref 5–17)
BUN SERPL-MCNC: 11 MG/DL — SIGNIFICANT CHANGE UP (ref 7–23)
CALCIUM SERPL-MCNC: 9.2 MG/DL — SIGNIFICANT CHANGE UP (ref 8.5–10.1)
CHLORIDE SERPL-SCNC: 110 MMOL/L — HIGH (ref 96–108)
CO2 SERPL-SCNC: 24 MMOL/L — SIGNIFICANT CHANGE UP (ref 22–31)
CREAT SERPL-MCNC: 0.76 MG/DL — SIGNIFICANT CHANGE UP (ref 0.5–1.3)
EGFR: 93 ML/MIN/1.73M2 — SIGNIFICANT CHANGE UP
GLUCOSE SERPL-MCNC: 78 MG/DL — SIGNIFICANT CHANGE UP (ref 70–99)
HCT VFR BLD CALC: 37.2 % — SIGNIFICANT CHANGE UP (ref 34.5–45)
HGB BLD-MCNC: 11.9 G/DL — SIGNIFICANT CHANGE UP (ref 11.5–15.5)
MCHC RBC-ENTMCNC: 27.7 PG — SIGNIFICANT CHANGE UP (ref 27–34)
MCHC RBC-ENTMCNC: 32 GM/DL — SIGNIFICANT CHANGE UP (ref 32–36)
MCV RBC AUTO: 86.5 FL — SIGNIFICANT CHANGE UP (ref 80–100)
PLATELET # BLD AUTO: 203 K/UL — SIGNIFICANT CHANGE UP (ref 150–400)
POTASSIUM SERPL-MCNC: 3.5 MMOL/L — SIGNIFICANT CHANGE UP (ref 3.5–5.3)
POTASSIUM SERPL-SCNC: 3.5 MMOL/L — SIGNIFICANT CHANGE UP (ref 3.5–5.3)
RBC # BLD: 4.3 M/UL — SIGNIFICANT CHANGE UP (ref 3.8–5.2)
RBC # FLD: 14 % — SIGNIFICANT CHANGE UP (ref 10.3–14.5)
SODIUM SERPL-SCNC: 142 MMOL/L — SIGNIFICANT CHANGE UP (ref 135–145)
WBC # BLD: 6.72 K/UL — SIGNIFICANT CHANGE UP (ref 3.8–10.5)
WBC # FLD AUTO: 6.72 K/UL — SIGNIFICANT CHANGE UP (ref 3.8–10.5)

## 2023-05-20 PROCEDURE — 93306 TTE W/DOPPLER COMPLETE: CPT | Mod: 26

## 2023-05-20 PROCEDURE — 99222 1ST HOSP IP/OBS MODERATE 55: CPT

## 2023-05-20 PROCEDURE — 99232 SBSQ HOSP IP/OBS MODERATE 35: CPT

## 2023-05-20 PROCEDURE — 93010 ELECTROCARDIOGRAM REPORT: CPT

## 2023-05-20 PROCEDURE — 99223 1ST HOSP IP/OBS HIGH 75: CPT

## 2023-05-20 RX ORDER — MECLIZINE HCL 12.5 MG
12.5 TABLET ORAL EVERY 6 HOURS
Refills: 0 | Status: DISCONTINUED | OUTPATIENT
Start: 2023-05-20 | End: 2023-05-20

## 2023-05-20 RX ORDER — SODIUM CHLORIDE 9 MG/ML
1000 INJECTION, SOLUTION INTRAVENOUS
Refills: 0 | Status: DISCONTINUED | OUTPATIENT
Start: 2023-05-20 | End: 2023-05-21

## 2023-05-20 RX ORDER — MECLIZINE HCL 12.5 MG
25 TABLET ORAL EVERY 6 HOURS
Refills: 0 | Status: DISCONTINUED | OUTPATIENT
Start: 2023-05-20 | End: 2023-05-22

## 2023-05-20 RX ORDER — METOCLOPRAMIDE HCL 10 MG
5 TABLET ORAL EVERY 6 HOURS
Refills: 0 | Status: DISCONTINUED | OUTPATIENT
Start: 2023-05-20 | End: 2023-05-21

## 2023-05-20 RX ORDER — SODIUM CHLORIDE 9 MG/ML
1000 INJECTION INTRAMUSCULAR; INTRAVENOUS; SUBCUTANEOUS
Refills: 0 | Status: COMPLETED | OUTPATIENT
Start: 2023-05-20 | End: 2023-05-20

## 2023-05-20 RX ADMIN — Medication 5 MILLIGRAM(S): at 14:30

## 2023-05-20 RX ADMIN — SODIUM CHLORIDE 75 MILLILITER(S): 9 INJECTION, SOLUTION INTRAVENOUS at 14:31

## 2023-05-20 RX ADMIN — Medication 12.5 MILLIGRAM(S): at 09:52

## 2023-05-20 RX ADMIN — SODIUM CHLORIDE 80 MILLILITER(S): 9 INJECTION INTRAMUSCULAR; INTRAVENOUS; SUBCUTANEOUS at 02:31

## 2023-05-20 NOTE — CONSULT NOTE ADULT - PROBLEM SELECTOR RECOMMENDATION 9
Syncope has characteristics of orthostatic and vagal mediated etiologies and occurring in the setting of a dysautonomia syndrome.  There has been no arrhythmia on telemetry.  I would like to check orthostatic vital signs (when patient is able to sit/stand) + TTE.  She also seems to describe vertigo -- recommend neurology evaluation.    * Case discussed with Dr Galindo.

## 2023-05-20 NOTE — CONSULT NOTE ADULT - SUBJECTIVE AND OBJECTIVE BOX
CHIEF COMPLAINT: Patient is a 53y old  Female who presents with a chief complaint of Syncope (19 May 2023 23:54)    HPI:  54 y/o woman who has been ill since ~ 11/2022 when she was diagnosed with dysautonomia.  She presented to the ER following 2 syncopal episodes at home.  She describes awakening yesterday with right shoulder pain and subsequently lost consciousness while experiencing nausea; she lost consciousness a 2nd time after standing up from bed and came to the ER for evaluation.  She reports no history of heart disease and describes a thorough work-up in [facility] ~ December 2022 (including echo, CT).  She currently complains of severe dizziness when turning her head in bed and feels like she would pass out if she were to sit upright. She describes occasional palpitations (attributed to premature beats in past), labile BP.    PAST MEDICAL & SURGICAL HISTORY:  History of facial surgery  History of knee surgery    SOCIAL HISTORY:   Alcohol: Denied  Smoking: Nonsmoker  Marital Status:     FAMILY HISTORY:   pancreatic cancer (Mother)  hypertension (Father)  diabetes mellitus (DM) (Father)      MEDICATIONS  (STANDING):    MEDICATIONS  (PRN):  meclizine 12.5 milliGRAM(s) Oral every 6 hours PRN Dizziness  ondansetron Injectable 4 milliGRAM(s) IV Push every 6 hours PRN Nausea and/or Vomiting    Allergies:  No Known Allergies    REVIEW OF SYSTEMS:  CONSTITUTIONAL: No  fevers or chills  Eyes: No visual changes  NECK: No pain or stiffness  RESPIRATORY: No cough, wheezing, hemoptysis; No shortness of breath  CARDIOVASCULAR: No angina but right shoulder area pain; occasional palpitations  GASTROINTESTINAL: No abdominal pain. + nausea  GENITOURINARY: No dysuria  NEUROLOGICAL: Dizziness with head turning  SKIN: No itching or rash  All other review of systems is negative unless indicated above    VITAL SIGNS:   Vital Signs Last 24 Hrs  T(C): 36.6 (20 May 2023 09:29), Max: 36.8 (19 May 2023 11:42)  T(F): 97.8 (20 May 2023 09:29), Max: 98.3 (19 May 2023 11:42)  HR: 100 (20 May 2023 09:29) (56 - 100)  BP: 109/55 (20 May 2023 09:29) (101/60 - 123/74)  BP(mean): 73 (19 May 2023 15:08) (73 - 73)  RR: 18 (20 May 2023 09:29) (16 - 20)  SpO2: 100% (20 May 2023 09:29) (94% - 100%)    PHYSICAL EXAM:  Constitutional: NAD, awake and alert  HEENT:  EOMI,  Pupils round, No oral cyanosis.  Pulmonary: Non-labored, breath sounds are clear bilaterally, No wheezing, rales or rhonchi  Cardiovascular: S1 and S2, regular rate and rhythm, no Murmur  Gastrointestinal: Bowel Sounds presen.   Lymph: No peripheral edema.   Neurological: Normal speech, moves all extremities  Skin: No rashes.  Psych:  Mood & affect appropriate    LABS:                11.9   6.72  )-----------( 203      ( 20 May 2023 06:47 )             37.2              142    |  110    |  11     ----------------------------<  78     3.5     |  24     |  0.76     Ca    9.2        20 May 2023 06:47  Phos  3.2       19 May 2023 12:19  Mg     2.0       19 May 2023 12:19    TPro  7.5    /  Alb  3.7    /  TBili  0.6    /  DBili  x      /  AST  22     /  ALT  33     /  AlkPhos  42     19 May 2023 12:19    PT/INR - ( 19 May 2023 12:19 )   PT: 11.5 sec;   INR: 0.99 ratio    PTT - ( 19 May 2023 12:19 )  PTT:25.2 sec    12 Lead ECG (05.19.23 @ 12:31):  Normal sinus rhythm  Left posterior fascicular block  Abnormal ECG    US Abdomen Upper Quadrant Right (05.19.23 @ 13:06):  Normal right upper quadrant abdominal ultrasound.    Xray Chest 1 View- PORTABLE-Urgent (05.19.23 @ 14:32): No radiographic evidence of active chest disease.    CT Head No Cont (05.19.23 @ 13:14):  No acute abnormality within the brain. Minimal soft tissue swelling in the LEFT forehead.

## 2023-05-20 NOTE — CONSULT NOTE ADULT - ASSESSMENT
53 year old woman with CMT, dysautonomia, presenting after one syncopal, and one presyncopal event, now with persistent positional/orthostatic vertigo.  Mildly improving now with continuous IV fluids.  On exam, normal mental status, cranial nerves, and strength.  Loss of sensation and areflexia present on exam.  Normal CTH. Has been on cardiac tele monitoring, without any events thus far.    Suspect worsening orthostatic symptoms.  May be related to decompensated dysautonomia.      -an MRI is pending to assess for central cause of her vertigo.  Will follow up.   -please continue with IV fluids  -continue to attempt checking orthostatic vitals  -continue cardiac monitoring  -urinalysis negative for LE and nitrites  -she does have compression stockings at home; her  will bring them to her in hospital.  We did discuss opting for waist high compression stockings, and possibly an abdominal binder.    -if still symptomatic after fluid optimization, can consider addition of salt tablets, and fludrocortisone.   -ultimately, will need a sleep study to assess for nocturnal hypoventilation.  Please give referral to neurology for sleep study.   -cardiology follow up  -neurology to follow.  Please page or call for any acute issues.

## 2023-05-20 NOTE — CONSULT NOTE ADULT - SUBJECTIVE AND OBJECTIVE BOX
53 year old woman with history of Charcot Yusra Tooth, dysautonomia syndrome, who was admitted on 5/19 after loss of consciousness at home.      She gives the history together with her  Jhonatan.  Patient woke up on 5/29 at about 0500 and felt a pain in the shoulder, as well as need to use the toilet.  She asked her  to assist her to the toilet because of the pain, and while urinating, she slumped forward and had lost consciousness.  Her  lowered her to the floor, and noted a few generalized jerks of the body.  She hit her head on the floor.  She regained consciousness after an estimated 60 seconds or so, and was not disoriented.  She was able to walk back to the bed.  EMS was activated, and transport to the ED was offered, but ultimately declined.  The patient went back to bed.  She woke up several hours later, and went to the toilet again.  This time, on the way there she felt that she was about to lose consciousness; her  caught her, and noted she had lost all tone, and lowered her to the floor.  She became very vertiginous and needed to stay on the floor.  EMS was activated again, and she was brought to the ED.      The patient now is experiencing persistent vertiginous symptoms when upright, and now supported by the bed.  She is unable to sit up, and definitely not stand up.  when she is lying on the bed, she is asymptomatic.  Now she can tolerate the bed at an angle of 45 degrees, but when she was first admitted, she needed to lay flat.      She has been receiving continuous IV fluids, and meclizine.  She had a CTH that did not show any abnormalities.      She reports that she has been at her baseline in terms of her CMT symptoms.  She has very minimal feeling in the lower extremities.  She is able to walk, and has an active lifestyle.  She exercises on an exercise bike.  She reports that she did have Strep B UTI diagnosed 1 week ago.  She denies other acute symptoms.  She does endorse chronic headaches, 'brain fog', and nominally blurred vision over the last several months.      She was seen at the [facility] in January of this year due to worsening sensory symptoms, and tachycardia.  She was diagnosed there with dysautonomia.  She describes a full battery of tests, including MRI's of the entire neuroaxis, with and without contrast.  She had lumbar punctures, including testing for lyme, HIV, and paraneoplastic processes, as well as autommune/inflammatory causes of peripheral neuropathy.  She says she had genetic testing done in the past, and had 3 copies of the CMT gene.  She has a strong family history of CMT, in her father, grandfather, and numerous first cousins.      Regarding her dysautonomia, she has only tried compression stockings that are thigh high.  She was recommended to start salt tablets by neurologists at the [facility], but she declined, concerned that 10g of salt tablets was too high.  She has not tried any adrenergics, or cortisols.      She is enrolled in a dysautonomia clinic at [facility].  She also participates in a nutritional group that espouses the benefits of ketosis, and high concentrations of thiamine in peripheral nerve regeneration.      EXAM:   GEN: lying in bed, NAD.  Not symptomatically vertiginous  CV: RRR, S1, S2  PULM: CTAB  EXTREM: no CCE    NEURO:   Awake, alert, oriented to hospital, month, year, normal naming, fluency.  has excellent recall, insight, and reasoning.    Pupils 4-3mm, symmetric, brisk, full VF's, no papilledema, full EOM with no nystagmus, no facial weakness, facial sensation is intact and symmetric, hearing intact, tongue midline, no dysarthria, palate symmetric.   MOTOR: diminished bulk throughout.  Normal tone.  On confrontation 5/5 throughout biceps, triceps, wrist ext and .  5/5 hip flexors, knee extensors, and ankle dorsflexion.   SENSORY: diminished sensation to light touch, bilateral upper extremities.  Near absent light touch sensation in lower extremities.   COORDINATION; normal FNF  REFLEXES: absent BB, BR, patellar, achilles.    GAIT: deferred due to orthostatic vertigo.

## 2023-05-21 DIAGNOSIS — R42 DIZZINESS AND GIDDINESS: ICD-10-CM

## 2023-05-21 DIAGNOSIS — R07.9 CHEST PAIN, UNSPECIFIED: ICD-10-CM

## 2023-05-21 LAB
ANION GAP SERPL CALC-SCNC: 6 MMOL/L — SIGNIFICANT CHANGE UP (ref 5–17)
ANION GAP SERPL CALC-SCNC: 7 MMOL/L — SIGNIFICANT CHANGE UP (ref 5–17)
BUN SERPL-MCNC: 9 MG/DL — SIGNIFICANT CHANGE UP (ref 7–23)
BUN SERPL-MCNC: 9 MG/DL — SIGNIFICANT CHANGE UP (ref 7–23)
CALCIUM SERPL-MCNC: 9 MG/DL — SIGNIFICANT CHANGE UP (ref 8.5–10.1)
CALCIUM SERPL-MCNC: 9.5 MG/DL — SIGNIFICANT CHANGE UP (ref 8.5–10.1)
CHLORIDE SERPL-SCNC: 109 MMOL/L — HIGH (ref 96–108)
CHLORIDE SERPL-SCNC: 109 MMOL/L — HIGH (ref 96–108)
CO2 SERPL-SCNC: 28 MMOL/L — SIGNIFICANT CHANGE UP (ref 22–31)
CO2 SERPL-SCNC: 28 MMOL/L — SIGNIFICANT CHANGE UP (ref 22–31)
CORTIS AM PEAK SERPL-MCNC: 8.8 UG/DL — SIGNIFICANT CHANGE UP (ref 6–18.4)
CREAT SERPL-MCNC: 0.78 MG/DL — SIGNIFICANT CHANGE UP (ref 0.5–1.3)
CREAT SERPL-MCNC: 0.82 MG/DL — SIGNIFICANT CHANGE UP (ref 0.5–1.3)
CULTURE RESULTS: SIGNIFICANT CHANGE UP
D DIMER BLD IA.RAPID-MCNC: 249 NG/ML DDU — HIGH
EGFR: 85 ML/MIN/1.73M2 — SIGNIFICANT CHANGE UP
EGFR: 90 ML/MIN/1.73M2 — SIGNIFICANT CHANGE UP
GLUCOSE SERPL-MCNC: 77 MG/DL — SIGNIFICANT CHANGE UP (ref 70–99)
GLUCOSE SERPL-MCNC: 79 MG/DL — SIGNIFICANT CHANGE UP (ref 70–99)
MAGNESIUM SERPL-MCNC: 1.8 MG/DL — SIGNIFICANT CHANGE UP (ref 1.6–2.6)
MAGNESIUM SERPL-MCNC: 2 MG/DL — SIGNIFICANT CHANGE UP (ref 1.6–2.6)
PHOSPHATE SERPL-MCNC: 2.6 MG/DL — SIGNIFICANT CHANGE UP (ref 2.5–4.5)
PHOSPHATE SERPL-MCNC: 3.1 MG/DL — SIGNIFICANT CHANGE UP (ref 2.5–4.5)
POTASSIUM SERPL-MCNC: 3.6 MMOL/L — SIGNIFICANT CHANGE UP (ref 3.5–5.3)
POTASSIUM SERPL-MCNC: 3.7 MMOL/L — SIGNIFICANT CHANGE UP (ref 3.5–5.3)
POTASSIUM SERPL-SCNC: 3.6 MMOL/L — SIGNIFICANT CHANGE UP (ref 3.5–5.3)
POTASSIUM SERPL-SCNC: 3.7 MMOL/L — SIGNIFICANT CHANGE UP (ref 3.5–5.3)
SODIUM SERPL-SCNC: 143 MMOL/L — SIGNIFICANT CHANGE UP (ref 135–145)
SODIUM SERPL-SCNC: 144 MMOL/L — SIGNIFICANT CHANGE UP (ref 135–145)
SPECIMEN SOURCE: SIGNIFICANT CHANGE UP
TROPONIN I, HIGH SENSITIVITY RESULT: 6.01 NG/L — SIGNIFICANT CHANGE UP

## 2023-05-21 PROCEDURE — 70551 MRI BRAIN STEM W/O DYE: CPT | Mod: 26

## 2023-05-21 PROCEDURE — 99223 1ST HOSP IP/OBS HIGH 75: CPT

## 2023-05-21 PROCEDURE — 99233 SBSQ HOSP IP/OBS HIGH 50: CPT

## 2023-05-21 PROCEDURE — 93010 ELECTROCARDIOGRAM REPORT: CPT

## 2023-05-21 PROCEDURE — 99232 SBSQ HOSP IP/OBS MODERATE 35: CPT

## 2023-05-21 RX ORDER — HEPARIN SODIUM 5000 [USP'U]/ML
5000 INJECTION INTRAVENOUS; SUBCUTANEOUS EVERY 12 HOURS
Refills: 0 | Status: DISCONTINUED | OUTPATIENT
Start: 2023-05-21 | End: 2023-05-22

## 2023-05-21 RX ORDER — METOCLOPRAMIDE HCL 10 MG
10 TABLET ORAL EVERY 6 HOURS
Refills: 0 | Status: DISCONTINUED | OUTPATIENT
Start: 2023-05-21 | End: 2023-05-22

## 2023-05-21 RX ADMIN — Medication 25 MILLIGRAM(S): at 10:30

## 2023-05-21 RX ADMIN — Medication 5 MILLIGRAM(S): at 10:29

## 2023-05-21 RX ADMIN — SODIUM CHLORIDE 75 MILLILITER(S): 9 INJECTION, SOLUTION INTRAVENOUS at 03:17

## 2023-05-21 RX ADMIN — Medication 25 MILLIGRAM(S): at 00:24

## 2023-05-21 NOTE — PHYSICAL THERAPY INITIAL EVALUATION ADULT - NSPTDMEREC_GEN_A_CORE
Pt would benefit from RW when ambulating alone for safety./rolling walker

## 2023-05-21 NOTE — PROGRESS NOTE ADULT - ASSESSMENT
MEDICATIONS  (STANDING):    MEDICATIONS  (PRN):  meclizine 25 milliGRAM(s) Oral every 6 hours PRN Dizziness  metoclopramide 10 milliGRAM(s) Oral every 6 hours PRN Dizziness/Nausea/Vertigo  ondansetron Injectable 4 milliGRAM(s) IV Push every 6 hours PRN Nausea and/or Vomiting      Pt is admitted w/    Syncope.Unclear etilogy. Possibe vertigo and vasovagal/orthostatic hypotension on superimposed dysautonomia.  -Telemetry  -Troponin negative  -EKG without ichemic changes  -CT brain negative  -TTE grossly unremarkbale  -MR brain negative for any acute process  -Reported recent normal carotid US 9/2022  -Orthostatics negative  -meclizine PRN. reglan for dizziness refractory to meclizine.  -Neuro/Cardiology consult/recs  -Pending decision to allow to proceed with CTA chest aorta and initiation of steroids.     DVT Prophylaxis: SCDs->Prolonged hospitalization. Switch to lovenox subq
Pt is admitted w/    Syncope.Unclear etilogy. Possibe vertigo and vasovagal/orthostatic hypotension on superimposed dysautonomia.  -Telemetry  -Troponin negative  -EKG without ichemic changes  -CT brain negative  -TTE  -MR brain ordered  -Reported recent normal carotid US 9/2022  -Orthostatics when able to tolerated  -IVF as needed for fluid balance  -meclizine PRN. reglan for dizziness refractory to meclizine.  -Neuro/Cardiology consult/recs    DVT Prophylaxis: SCDs
53 year old woman with CMT, dysautonomia, presenting after one syncopal, and one presyncopal event, now with persistent positional/orthostatic vertigo.  Symptoms are waxing and waning, despite aggressive fluid resuscitation and anticholinergics.  On exam, normal mental status, cranial nerves, and strength.  Loss of sensation and areflexia present on exam.  Orthostatics at bedside without decrease in BP or changes in HR.  Normal CTH. Has been on cardiac tele monitoring, without any events thus far.  MRi brain normal.        -please continue with IV fluids  -continue cardiac monitoring  -conservative measures including compression stockings indicated.    -no indication for alpha agonists or steroids at this point given the absence of orthostatic biomarker  -ultimately, will need a sleep study to assess for nocturnal hypoventilation.  Please give referral to neurology for sleep study.   -symptomatic management for vertigo  -referral to outpatient neurologist at [facility] given, for neuromuscular neurologist with training in dysautonomia, Ileana Berrios.    -please page or call neurology with any acute neurological worsening, or other issues we can help address.  
breast self-exam/mammogram

## 2023-05-21 NOTE — PHYSICAL THERAPY INITIAL EVALUATION ADULT - GENERAL OBSERVATIONS, REHAB EVAL
CT results/Event Note
Palliative 
Pt rec'd supine in bed,  at bedside, pt pleasant and cooperative with PT, reports feeling better but still experiencing dizziness with positional changes.

## 2023-05-21 NOTE — PHYSICAL THERAPY INITIAL EVALUATION ADULT - PERTINENT HX OF CURRENT PROBLEM, REHAB EVAL
52 y/o female with PMHx of Charcot- Yusra -Tooth Disease 1, Dysautonomia,  arrhythmia (PVCs), Esophageal stricture and balloon dilatation, reported inability to swallow pills, elevated pancreatic enzymes currently on ketogenic diet for treatment of dysautonomia, who has been under work-up for tunnel vision and blurry vision. Pt presented via ambulance to [place] ED after two syncopal events.

## 2023-05-21 NOTE — CHART NOTE - NSCHARTNOTEFT_GEN_A_CORE
Pt with complaints of chest pain overnight. EKG ordered. BMP, Mg, Phos, trop ordered. Patient seen and examined at bedside. Reports that chest pain is exacerbated by breathing. D-dimer ordered.

## 2023-05-22 ENCOUNTER — TRANSCRIPTION ENCOUNTER (OUTPATIENT)
Age: 53
End: 2023-05-22

## 2023-05-22 VITALS
TEMPERATURE: 98 F | RESPIRATION RATE: 18 BRPM | OXYGEN SATURATION: 100 % | DIASTOLIC BLOOD PRESSURE: 77 MMHG | SYSTOLIC BLOOD PRESSURE: 129 MMHG | HEART RATE: 57 BPM

## 2023-05-22 LAB
ANION GAP SERPL CALC-SCNC: 6 MMOL/L — SIGNIFICANT CHANGE UP (ref 5–17)
BUN SERPL-MCNC: 14 MG/DL — SIGNIFICANT CHANGE UP (ref 7–23)
CALCIUM SERPL-MCNC: 9.4 MG/DL — SIGNIFICANT CHANGE UP (ref 8.5–10.1)
CHLORIDE SERPL-SCNC: 105 MMOL/L — SIGNIFICANT CHANGE UP (ref 96–108)
CO2 SERPL-SCNC: 29 MMOL/L — SIGNIFICANT CHANGE UP (ref 22–31)
CREAT SERPL-MCNC: 0.8 MG/DL — SIGNIFICANT CHANGE UP (ref 0.5–1.3)
EGFR: 88 ML/MIN/1.73M2 — SIGNIFICANT CHANGE UP
GLUCOSE SERPL-MCNC: 79 MG/DL — SIGNIFICANT CHANGE UP (ref 70–99)
POTASSIUM SERPL-MCNC: 3.4 MMOL/L — LOW (ref 3.5–5.3)
POTASSIUM SERPL-SCNC: 3.4 MMOL/L — LOW (ref 3.5–5.3)
SARS-COV-2 RNA SPEC QL NAA+PROBE: SIGNIFICANT CHANGE UP
SODIUM SERPL-SCNC: 140 MMOL/L — SIGNIFICANT CHANGE UP (ref 135–145)

## 2023-05-22 PROCEDURE — 99232 SBSQ HOSP IP/OBS MODERATE 35: CPT

## 2023-05-22 PROCEDURE — 99239 HOSP IP/OBS DSCHRG MGMT >30: CPT

## 2023-05-22 PROCEDURE — 93970 EXTREMITY STUDY: CPT | Mod: 26

## 2023-05-22 RX ORDER — METOCLOPRAMIDE HCL 10 MG
1 TABLET ORAL
Qty: 5 | Refills: 0
Start: 2023-05-22 | End: 2023-05-26

## 2023-05-22 RX ORDER — MECLIZINE HCL 12.5 MG
1 TABLET ORAL
Qty: 15 | Refills: 0
Start: 2023-05-22 | End: 2023-05-26

## 2023-05-22 NOTE — PROGRESS NOTE ADULT - PROBLEM SELECTOR PLAN 3
Non-anginal characteristics and associated with serial ECGs without ischemic findings and normal assays of troponin.    * Case discussed with Dr Galindo.
Non-anginal characteristics and associated with serial ECGs without ischemic findings and normal assays of troponin.

## 2023-05-22 NOTE — PROGRESS NOTE ADULT - PROBLEM SELECTOR PLAN 1
I suspect dysautonomia as etiology and await orthostatic vital signs when she is able to stand upright.  Telemetry reviewed: No arrhythmia.
Orthostatics neg.  Still suspect dysautonomia as cause of pt's syncope.

## 2023-05-22 NOTE — DISCHARGE NOTE PROVIDER - CARE PROVIDER_API CALL
[place])  Neurology; Vascular Neurology  [address] [place] [address]  Phone: [phone]  Fax: [phone]  Follow Up Time:     Pee Sadler)  Cardiovascular Disease; Internal Medicine  [address] [facility] [address]  [place] [address]  Phone: [phone]  Fax: [phone]  Follow Up Time:

## 2023-05-22 NOTE — DISCHARGE NOTE PROVIDER - NSDCCPCAREPLAN_GEN_ALL_CORE_FT
PRINCIPAL DISCHARGE DIAGNOSIS  Diagnosis: Syncope  Assessment and Plan of Treatment: # Syncope - suspected secondary to benign positional vertigo /  dysautonomia.  - telemetry - sinus 50s -80s  - Troponin negative  - EKG without ichemic changes  - CT brain negative  - TTE grossly unremarkbale  - MR brain negative for any acute process  - Reported recent normal carotid US 9/2022  - Orthostatics negative  - meclizine PRN. reglan for dizziness refractory to meclizine.       PRINCIPAL DISCHARGE DIAGNOSIS  Diagnosis: Syncope  Assessment and Plan of Treatment: # Syncope - suspected secondary to benign positional vertigo /  dysautonomia.  - telemetry - sinus 50s -80s  - Troponin negative  - EKG without ichemic changes  - CT brain negative  - TTE grossly unremarkbale  - MR brain negative for any acute process  - Reported recent normal carotid US 9/2022  - Orthostatics negative  - meclizine PRN. reglan for dizziness refractory to meclizine.  - referral to outpatient neurologist at [facility], for neuromuscular neurologist with training in dysautonomia, Ileana Berrios.         PRINCIPAL DISCHARGE DIAGNOSIS  Diagnosis: Syncope  Assessment and Plan of Treatment: # Syncope - suspected secondary to benign positional vertigo /  dysautonomia.  - telemetry - sinus 50s -80s  - Troponin negative  - EKG without ichemic changes  - CT brain negative  - TTE grossly unremarkbale  - prednisone taper 40 mg x 2 days, 20 mg x 2 days, 10 mg x 2 days  - MR brain negative for any acute process  - Reported recent normal carotid US 9/2022  - Orthostatics negative  - meclizine PRN. reglan for dizziness refractory to meclizine.  - referral to outpatient neurologist at [facility], for neuromuscular neurologist with training in dysautonomia, Ileana Berrios.

## 2023-05-22 NOTE — PROGRESS NOTE ADULT - SUBJECTIVE AND OBJECTIVE BOX
HPI:    REASON FOR VISIT: Syncope    HPI:  52 y/o woman who has been ill since ~ 11/2022 when she was diagnosed with dysautonomia.  She presented to the ER following 2 syncopal episodes at home.  She describes awakening yesterday with right shoulder pain and subsequently lost consciousness while experiencing nausea; she lost consciousness a 2nd time after standing up from bed and came to the ER for evaluation.  She reports no history of heart disease and describes a thorough work-up in [facility] ~ December 2022 (including echo, CT).  She currently complains of severe dizziness when turning her head in bed and feels like she would pass out if she were to sit upright. She describes occasional palpitations (attributed to premature beats in past), labile BP.    5/21/2023:  Improved dizziness but not able to stand-up / ambulate.  Mild chest discomfort.  5/22/'23: mild atypical chest discomfort elicited w/ palpation.    MEDICATIONS:  OUTPATIENT  Home Medications:  cod liver oil:  (20 May 2023 01:37)  vitamin B 1:  (20 May 2023 01:36)      INPATIENT  MEDICATIONS  (STANDING):  heparin   Injectable 5000 Unit(s) SubCutaneous every 12 hours    MEDICATIONS  (PRN):  meclizine 25 milliGRAM(s) Oral every 6 hours PRN Dizziness  metoclopramide 10 milliGRAM(s) Oral every 6 hours PRN Dizziness/Nausea/Vertigo  ondansetron Injectable 4 milliGRAM(s) IV Push every 6 hours PRN Nausea and/or Vomiting          Vital Signs Last 24 Hrs  T(C): 36.8 (22 May 2023 08:26), Max: 36.8 (22 May 2023 08:26)  T(F): 98.2 (22 May 2023 08:26), Max: 98.2 (22 May 2023 08:26)  HR: 57 (22 May 2023 08:26) (57 - 66)  BP: 129/77 (22 May 2023 08:26) (120/83 - 129/77)  BP(mean): --  RR: 18 (22 May 2023 08:26) (18 - 18)  SpO2: 100% (22 May 2023 08:26) (99% - 100%)    Parameters below as of 22 May 2023 08:26  Patient On (Oxygen Delivery Method): room air    Daily     Daily I&O's Summary      I&O's Detail      I&O's Summary      PHYSICAL EXAM:  Constitutional: NAD, awake and alert, semi-upright in bed, changes position more briskly than yesterday but still describes dizziness  Pulmonary: Non-labored, breath sounds are clear bilaterally  Cardiovascular: S1 and S2, regular  Psych:  Mood & affect appropriate        ===============================  ===============================  LABS:                         11.9   6.72  )-----------( 203      ( 20 May 2023 06:47 )             37.2     22 May 2023 09:59    140    |  105    |  14     ----------------------------<  79     3.4     |  29     |  0.80   21 May 2023 07:05    143    |  109    |  9      ----------------------------<  77     3.7     |  28     |  0.78   21 May 2023 00:53    144    |  109    |  9      ----------------------------<  79     3.6     |  28     |  0.82     Ca    9.4        22 May 2023 09:59  Ca    9.5        21 May 2023 07:05  Ca    9.0        21 May 2023 00:53  Phos  3.1       21 May 2023 07:05  Phos  2.6       21 May 2023 00:53  Mg     2.0       21 May 2023 07:05  Mg     1.8       21 May 2023 00:53        ===============================  ===============================  CARDIAC BIOMARKERS:  BNP      TROPONIN  Troponin I, High Sensitivity Result: 6.01 ng/L (05-21-23 @ 00:53)  Troponin I, High Sensitivity Result: 3.24 ng/L (05-19-23 @ 14:47)  Troponin I, High Sensitivity Result: 3.28 ng/L (05-19-23 @ 12:19)  Troponin I, High Sensitivity Result: 4.06 ng/L (08-08-22 @ 13:58)  Troponin I, Serum: <0.015 ng/mL [0.015 - 0.045] (11-30-18 @ 01:21)  Troponin I, Serum: <0.015 ng/mL [0.015 - 0.045] (11-29-18 @ 23:01)    ===============================  ===============================  US Abdomen Upper Quadrant Right (05.19.23 @ 13:06):  Normal right upper quadrant abdominal ultrasound.    Xray Chest 1 View- PORTABLE-Urgent (05.19.23 @ 14:32): No radiographic evidence of active chest disease.    CT Head No Cont (05.19.23 @ 13:14):  No acute abnormality within the brain. Minimal soft tissue swelling in the LEFT forehead.    MR Head No Cont (05.21.23 @ 09:20):  Unremarkable brain MRI  ===============================    Isra Stout M.D.  Cardiology, [facility]  Cell: [phone]  Offices:   [phone] ([facility] Office)  [phone] ([facility] Office)   
I saw and examined the patient at the bedside.    She reports the vertigo is somewhat worse today.  She has been on continuous IV fluids, and meclizine, without much relief.   She had an MRi brain overnight that does not show any acute or chronic intracranial pathology.     On exam:   Orthostatic vitals performed at bedside with Dr. Tierney.  Lying, sitting, and standing, within 130-140/70-80, HR mid 70's throughout.  Patient reported symptoms  Awake, alert, oriented, follows commands, normal naming, fluency. Intact recall, reasoning, and insight.   Pupils 4-3mm, symmetric, conjugate gaze, full EOM, no facial weakness, no dysarthria, tongue midline, palate symmetric.   MOTOR: diminished bulk throughout.  no fasciculations.  Tone diminished throughout.  On confrontation 5/5 throughout deltoids, biceps, triceps and .  5/5 hip flexors, knee extensors.    SENSORY: diminished slightly in bilatearl upper extremities, near absent light touch sensation lower extremities.   COORDINATION: normal FNF  REFLEXES: areflexic BB, Br, triceps, patellar, achilles, toes bilaterally down.     MRI brain images reviewed: no diffusion restriction.  No abnormal FLAIR hyperintensities.  
CHIEF COMPLAINT: Syncope/Dizziness    SUBJECTIVE/SIGNIFICANT INTERVAL EVENTS/OVERNIGHT EVENTS:    5/20: Dizziness persistent. Most notably when turning head and/or change from lying to sitting posistion.  Vitals and labs stable. Afebrile. nausea resolved.     5/21: Sharp substernal pleurtic chest pain overnight. Troponin negative. EKG unremarkable. Dimer flagged as red at 249 but below age adjusted threshold and no tachycardia, dysnpea or hypoxia. Orthostatics today negative but with reported worsening dizziness. Patient was offered CTA chest on admission but ER because of symptoms that may be suggestive of dissection. This was offered again but patient hesitant on the use of contrast dye. Awaiting patient's decision/ok to order CTA chest angio/aorta. In addition offered steroid for possible underlying vestibular neuritis/labrinthytis. Patient said she would think about it to assess whether she would be ok with treatment with steroids. Awaiting decision on being able to initiate. All other labs and work up grossly unremarkable thus far.     Review of Systems: 14 Point review of systems reviewed and reported as negative unless otherwise stated above    FROM H&P:  "Pt is a pleasant  52 y/o female with PMHx of Charcot- Yusra -Tooth Disease 1, Dysautonomia,  arrhythmia (PVCs), Esophageal stricture and balloon dilatation, reported inability to swallow pills, elevated pancreatic enzymes currently on ketogenic diet for treatment of dysautonomia, who has been under work-up for tunnel vision and blurry vision. Pt presented via ambulance to [place] ED after two syncopal events.    Pt reports having severe right chest and shoulder pain that  woke her up at 5 AM.    She went to the bathroom, where she urinated and had a bowel movement.  Pt reports  she suddenly felt nauseous  and her  reports she then slumped over and had  LOC while on the toilet.  She  landed on her forehead and right arm.   Pt later felt lightheaded and nauseated , with dizziness.  She reported not being able to lift her head or walk.      Pt reported  RUQ discomfort,  She denies fever/chills,  no URI complaints.  Pt reports urinary frequency and cloudy urine.  She reports a loose stool/day due to Mag citrate intake.  She reports orange stool while on a current ketogenic diet.  Pt states she had a Strep B urine cx for which she took oil of oregano for 7 days.   She denies sick contacts.     Pt sleeps on an angle due to her dysautonomia.  "    PHYSICAL EXAM:    T(C): 36.7 (05-21-23 @ 16:21), Max: 36.8 (05-20-23 @ 23:45)  HR: 65 (05-21-23 @ 16:21) (56 - 65)  BP: 117/69 (05-21-23 @ 16:21) (112/73 - 124/75)  RR: 18 (05-21-23 @ 16:21) (18 - 18)  SpO2: 99% (05-21-23 @ 16:21) (99% - 100%)    General: AAOx3; NAD  Head: AT/NC  ENT: Moist Mucous Membranes; No Injury  Neck: Non-tender; No JVD  CVS: RRR, S1&S2, No murmur,   Respiratory: Lungs CTA B/L; Normal Respiratory Effort  Abdomen/GI: Soft, non-tender, non-distended, no guarding, no rebound, normal bowel sounds  Extremities: No cyanosis, No clubbing, No edema  MSK: No CVA tenderness, No  apparent injury; muscle atrophy  Neuro: AAOx3, CNII-XII grossly intact, non-focal  Psych: Appropriate, Cooperative; slightly anxious  Skin: Clean, Dry and Intact      LABS:                          11.9   6.72  )-----------( 203      ( 20 May 2023 06:47 )             37.2     05-21    143  |  109<H>  |  9   ----------------------------<  77  3.7   |  28  |  0.78    Ca    9.5      21 May 2023 07:05  Phos  3.1     05-21  Mg     2.0     05-21        CAPILLARY BLOOD GLUCOSE          Culture - Urine (collected 19 May 2023 14:53)  Source: Clean Catch None  Preliminary Report (21 May 2023 15:41):    10,000 - 49,000 CFU/mL Gram Positive Cocci in Pairs and Chains                            11.9   6.72  )-----------( 203      ( 20 May 2023 06:47 )             37.2     05-20    142  |  110<H>  |  11  ----------------------------<  78  3.5   |  24  |  0.76    Ca    9.2      20 May 2023 06:47  Phos  3.2     05-19  Mg     2.0     05-19    TPro  7.5  /  Alb  3.7  /  TBili  0.6  /  DBili  x   /  AST  22  /  ALT  33  /  AlkPhos  42  05-19      CAPILLARY BLOOD GLUCOSE    Urinalysis (05.19.23 @ 14:53)   Glucose Qualitative, Urine: Negative  Blood, Urine: Negative  pH Urine: 7.0  Color: Yellow  Urine Appearance: Clear  Bilirubin: Negative  Ketone - Urine: Large  Specific Gravity: 1.015  Protein, Urine: Negative  Urobilinogen: Negative  Nitrite: Negative  Leukocyte Esterase Concentration: NegativeTroponin I, High Sensitivity Result: 3.24: Lipase, Serum: 209 U/L (05.19.23 @ 12:19)           RADIOLOGY:  < from: CT Head No Cont (05.19.23 @ 13:14) >  IMPRESSION:   No acute abnormality within the brain. Minimal soft tissue   swelling in the LEFT forehead.    < end of copied text >      EKG:  < from: 12 Lead ECG (05.19.23 @ 12:31) >  Diagnosis Line Normal sinus rhythm  Left posterior fascicular block  Abnormal ECG  When compared with ECG of 19-MAY-2023 12:30,  Premature supraventricular complexes are no longer Present    < end of copied text >      ECHO:  < from: TTE Echo Complete w/o Contrast w/ Doppler (05.20.23 @ 14:49) >   Summary     Estimated left ventricular ejection fraction is 55-60 %.   The left ventricle is normal in size, wall thickness, wall motion and   contractility as seen in limited views.   Normal appearing right atrium.   Prominent [employer] is present.   The aortic valve is trileaflet with thin pliable leaflets.   The mitral valve leaflets appear thin and normal.   Mild (1+) mitral regurgitation is present.   Normal appearing tricuspid valve structure.   Mild to Moderate Tricuspid regurgitation is present.    < end of copied text >            I personally reviewed labs, imaging, ekg, orders and vitals.    Discussed case with:  [X]RN  [X]CM/SW  [X]Patient  [X]Family  [X]Specialist: Cardiology, Neurology        
CHIEF COMPLAINT: Syncope/Dizziness    SUBJECTIVE/SIGNIFICANT INTERVAL EVENTS/OVERNIGHT EVENTS:    5/20: Dizziness persistent. Most notably when turning head and/or change from lying to sitting posistion.  Vitals and labs stable. Afebrile. nausea resolved.     Review of Systems: 14 Point review of systems reviewed and reported as negative unless otherwise stated above    FROM H&P:  "Pt is a pleasant  52 y/o female with PMHx of Charcot- Yusra -Tooth Disease 1, Dysautonomia,  arrhythmia (PVCs), Esophageal stricture and balloon dilatation, reported inability to swallow pills, elevated pancreatic enzymes currently on ketogenic diet for treatment of dysautonomia, who has been under work-up for tunnel vision and blurry vision. Pt presented via ambulance to [facility] ED after two syncopal events.    Pt reports having severe right chest and shoulder pain that  woke her up at 5 AM.    She went to the bathroom, where she urinated and had a bowel movement.  Pt reports  she suddenly felt nauseous  and her  reports she then slumped over and had  LOC while on the toilet.  She  landed on her forehead and right arm.   Pt later felt lightheaded and nauseated , with dizziness.  She reported not being able to lift her head or walk.      Pt reported  RUQ discomfort,  She denies fever/chills,  no URI complaints.  Pt reports urinary frequency and cloudy urine.  She reports a loose stool/day due to Mag citrate intake.  She reports orange stool while on a current ketogenic diet.  Pt states she had a Strep B urine cx for which she took oil of oregano for 7 days.   She denies sick contacts.     Pt sleeps on an angle due to her dysautonomia.  "    PHYSICAL EXAM:    T(C): 36.7 (05-20-23 @ 16:47), Max: 36.7 (05-19-23 @ 20:01)  HR: 55 (05-20-23 @ 16:47) (55 - 100)  BP: 121/80 (05-20-23 @ 16:47) (103/65 - 123/74)  RR: 18 (05-20-23 @ 16:47) (16 - 18)  SpO2: 99% (05-20-23 @ 16:47) (94% - 100%)    General: AAOx3; NAD  Head: AT/NC  ENT: Moist Mucous Membranes; No Injury  Eyes: EOMI; PERRL  Neck: Non-tender; No JVD  CVS: RRR, S1&S2, No murmur,   Respiratory: Lungs CTA B/L; Normal Respiratory Effort  Abdomen/GI: Soft, non-tender, non-distended, no guarding, no rebound, normal bowel sounds  : No bladder distention  Extremities: No cyanosis, No clubbing, No edema  MSK: No CVA tenderness, No  apparent injury; muscle atrophy  Neuro: AAOx3, CNII-XII grossly intact, non-focal  Psych: Appropriate, Cooperative; slightly anxious  Skin: Clean, Dry and Intact      LABS:                          11.9   6.72  )-----------( 203      ( 20 May 2023 06:47 )             37.2     05-20    142  |  110<H>  |  11  ----------------------------<  78  3.5   |  24  |  0.76    Ca    9.2      20 May 2023 06:47  Phos  3.2     05-19  Mg     2.0     05-19    TPro  7.5  /  Alb  3.7  /  TBili  0.6  /  DBili  x   /  AST  22  /  ALT  33  /  AlkPhos  42  05-19      CAPILLARY BLOOD GLUCOSE    Urinalysis (05.19.23 @ 14:53)   Glucose Qualitative, Urine: Negative  Blood, Urine: Negative  pH Urine: 7.0  Color: Yellow  Urine Appearance: Clear  Bilirubin: Negative  Ketone - Urine: Large  Specific Gravity: 1.015  Protein, Urine: Negative  Urobilinogen: Negative  Nitrite: Negative  Leukocyte Esterase Concentration: NegativeTroponin I, High Sensitivity Result: 3.24: Lipase, Serum: 209 U/L (05.19.23 @ 12:19)           RADIOLOGY:  < from: CT Head No Cont (05.19.23 @ 13:14) >  IMPRESSION:   No acute abnormality within the brain. Minimal soft tissue   swelling in the LEFT forehead.    < end of copied text >      EKG:  < from: 12 Lead ECG (05.19.23 @ 12:31) >  Diagnosis Line Normal sinus rhythm  Left posterior fascicular block  Abnormal ECG  When compared with ECG of 19-MAY-2023 12:30,  Premature supraventricular complexes are no longer Present    < end of copied text >      ECHO:            I personally reviewed labs, imaging, ekg, orders and vitals.    Discussed case with:  [X]RN  [X]CM/SW  [X]Patient  [X]Family  [X]Specialist: Cardiology        MEDICATIONS  (STANDING):  lactated ringers. 1000 milliLiter(s) (75 mL/Hr) IV Continuous <Continuous>    MEDICATIONS  (PRN):  meclizine 25 milliGRAM(s) Oral every 6 hours PRN Dizziness  metoclopramide 5 milliGRAM(s) Oral every 6 hours PRN Vertigo/Dizziness refractory to Meclizine  ondansetron Injectable 4 milliGRAM(s) IV Push every 6 hours PRN Nausea and/or Vomiting    
  REASON FOR VISIT: Syncope    HPI:  52 y/o woman who has been ill since ~ 11/2022 when she was diagnosed with dysautonomia.  She presented to the ER following 2 syncopal episodes at home.  She describes awakening yesterday with right shoulder pain and subsequently lost consciousness while experiencing nausea; she lost consciousness a 2nd time after standing up from bed and came to the ER for evaluation.  She reports no history of heart disease and describes a thorough work-up in [facility] ~ December 2022 (including echo, CT).  She currently complains of severe dizziness when turning her head in bed and feels like she would pass out if she were to sit upright. She describes occasional palpitations (attributed to premature beats in past), labile BP.    5/21/2023:  Improved dizziness but not able to stand-up / ambulate.  Mild chest discomfort.    MEDICATIONS  (STANDING):  lactated ringers. 1000 milliLiter(s) (75 mL/Hr) IV Continuous <Continuous>    MEDICATIONS  (PRN):  meclizine 25 milliGRAM(s) Oral every 6 hours PRN Dizziness  metoclopramide 5 milliGRAM(s) Oral every 6 hours PRN Vertigo/Dizziness refractory to Meclizine  ondansetron Injectable 4 milliGRAM(s) IV Push every 6 hours PRN Nausea and/or Vomiting    Vital Signs Last 24 Hrs  T(C): 36.7 (21 May 2023 09:43), Max: 36.8 (20 May 2023 23:45)  T(F): 98.1 (21 May 2023 09:43), Max: 98.3 (20 May 2023 23:45)  HR: 58 (21 May 2023 09:43) (55 - 58)  BP: 112/73 (21 May 2023 09:43) (112/73 - 124/75)  RR: 18 (21 May 2023 09:43) (18 - 18)  SpO2: 100% (21 May 2023 09:43) (99% - 100%)  Patient On (Oxygen Delivery Method): room air    Vital Signs Last 24 Hrs  T(C): 36.7 (21 May 2023 09:43), Max: 36.8 (20 May 2023 23:45)  T(F): 98.1 (21 May 2023 09:43), Max: 98.3 (20 May 2023 23:45)  HR: 58 (21 May 2023 09:43) (55 - 58)  BP: 112/73 (21 May 2023 09:43) (112/73 - 124/75)  RR: 18 (21 May 2023 09:43) (18 - 18)  SpO2: 100% (21 May 2023 09:43) (99% - 100%)  Patient On (Oxygen Delivery Method): room air    PHYSICAL EXAM:  Constitutional: NAD, awake and alert, semi-upright in bed, changes position more briskly than yesterday but still describes dizziness  Pulmonary: Non-labored, breath sounds are clear bilaterally  Cardiovascular: S1 and S2, regular  Psych:  Mood & affect appropriate    LABS:                       11.9   6.72  )-----------( 203      ( 20 May 2023 06:47 )             37.2     143  |  109<H>  |  9   ----------------------------<  77  3.7   |  28  |  0.78    Ca    9.5      21 May 2023 07:05  Phos  3.1     05-21  Mg     2.0     05-21    TPro  7.5  /  Alb  3.7  /  TBili  0.6  /  DBili  x   /  AST  22  /  ALT  33  /  AlkPhos  42  05-19    TroponinI hsT: <-6.01, <-3.24, <-3.28    US Abdomen Upper Quadrant Right (05.19.23 @ 13:06):  Normal right upper quadrant abdominal ultrasound.    Xray Chest 1 View- PORTABLE-Urgent (05.19.23 @ 14:32): No radiographic evidence of active chest disease.    CT Head No Cont (05.19.23 @ 13:14):  No acute abnormality within the brain. Minimal soft tissue swelling in the LEFT forehead.    MR Head No Cont (05.21.23 @ 09:20):  Unremarkable brain MRI

## 2023-05-22 NOTE — DISCHARGE NOTE NURSING/CASE MANAGEMENT/SOCIAL WORK - PATIENT PORTAL LINK FT
You can access the FollowMyHealth Patient Portal offered by [facility] by registering at the following website: [email or web address]. By joining [employer]’s FollowMyHealth portal, you will also be able to view your health information using other applications (apps) compatible with our system.

## 2023-05-22 NOTE — DISCHARGE NOTE PROVIDER - NSDCMRMEDTOKEN_GEN_ALL_CORE_FT
cod liver oil:   vitamin B 1:    cod liver oil:   meclizine 25 mg oral tablet: 1 tab(s) orally 3 times a day  metoclopramide 10 mg oral tablet, disintegratin tab(s) orally 3 times a day  predniSONE 10 mg oral tablet: 2 tab(s) orally 2 times a day  vitamin B 1:

## 2023-05-22 NOTE — DISCHARGE NOTE NURSING/CASE MANAGEMENT/SOCIAL WORK - NSDCPEFALRISK_GEN_ALL_CORE
For information on Fall & Injury Prevention, visit: [email or web address] OR  [email or web address] OR  https://www.cdc.gov/steadi/patient.html

## 2023-05-22 NOTE — PROGRESS NOTE ADULT - PROBLEM SELECTOR PLAN 2
Persistent but modestly improved; ? related to vertigo or a consequence of head trauma from fall prior to admission
Persistent but modestly improved; ? related to vertigo or a consequence of head trauma from fall prior to admission

## 2023-05-22 NOTE — DISCHARGE NOTE PROVIDER - HOSPITAL COURSE
"Pt is a pleasant  54 y/o female with PMHx of Charcot- Yusra -Tooth Disease 1, Dysautonomia,  arrhythmia (PVCs), Esophageal stricture and balloon dilatation, reported inability to swallow pills, elevated pancreatic enzymes currently on ketogenic diet for treatment of dysautonomia, who has been under work-up for tunnel vision and blurry vision. Pt presented via ambulance to [place] [facility] after two syncopal events.    Pt reports having severe right chest and shoulder pain that  woke her up at 5 AM.    She went to the bathroom, where she urinated and had a bowel movement.  Pt reports  she suddenly felt nauseous  and her  reports she then slumped over and had  LOC while on the toilet.  She  landed on her forehead and right arm.   Pt later felt lightheaded and nauseated , with dizziness.  She reported not being able to lift her head or walk.      Pt reported  RUQ discomfort,  She denies fever/chills,  no URI complaints.  Pt reports urinary frequency and cloudy urine.  She reports a loose stool/day due to Mag citrate intake.  She reports orange stool while on a current ketogenic diet.  Pt states she had a Strep B urine cx for which she took oil of oregano for 7 days.   She denies sick contacts.     Pt sleeps on an angle due to her dysautonomia.      Physical Exam:   GENERAL APPEARANCE:  NAD, hemodynamically stable  T(C): 36.8 (05-22-23 @ 08:26), Max: 36.8 (05-22-23 @ 08:26)  HR: 57 (05-22-23 @ 08:26) (57 - 66)  BP: 129/77 (05-22-23 @ 08:26) (117/69 - 129/77)  RR: 18 (05-22-23 @ 08:26) (18 - 18)  SpO2: 100% (05-22-23 @ 08:26) (99% - 100%)  HEENT:  Head is normocephalic    Skin:  Warm and dry without any rash   NECK:  Supple without lymphadenopathy.   HEART:  Regular rate and rhythm. normal S1 and S2, No M/R/G  LUNGS:  Good ins/exp effort, no W/R/R/C  ABDOMEN:  Soft, nontender, nondistended with good bowel sounds heard  EXTREMITIES:  Without cyanosis, clubbing or edema.   NEUROLOGICAL:  Gross nonfocal       "Pt is a pleasant  52 y/o female with PMHx of Charcot- Yusra -Tooth Disease 1, Dysautonomia,  arrhythmia (PVCs), Esophageal stricture and balloon dilatation, reported inability to swallow pills, elevated pancreatic enzymes currently on ketogenic diet for treatment of dysautonomia, who has been under work-up for tunnel vision and blurry vision. Pt presented via ambulance to [place] [facility] after two syncopal events.    Pt reports having severe right chest and shoulder pain that  woke her up at 5 AM.    She went to the bathroom, where she urinated and had a bowel movement.  Pt reports  she suddenly felt nauseous  and her  reports she then slumped over and had  LOC while on the toilet.  She  landed on her forehead and right arm.   Pt later felt lightheaded and nauseated , with dizziness.  She reported not being able to lift her head or walk.      Pt reported  RUQ discomfort,  She denies fever/chills,  no URI complaints.  Pt reports urinary frequency and cloudy urine.  She reports a loose stool/day due to Mag citrate intake.  She reports orange stool while on a current ketogenic diet.  Pt states she had a Strep B urine cx for which she took oil of oregano for 7 days.   She denies sick contacts.     Pt sleeps on an angle due to her dysautonomia.  Patient at this time does not wants to have a ct scan of the chest -  as she has had 10 Ct scans since january. Patient is willing to try trial of steroids.       Physical Exam:   GENERAL APPEARANCE:  NAD, hemodynamically stable  T(C): 36.8 (05-22-23 @ 08:26), Max: 36.8 (05-22-23 @ 08:26)  HR: 57 (05-22-23 @ 08:26) (57 - 66)  BP: 129/77 (05-22-23 @ 08:26) (117/69 - 129/77)  RR: 18 (05-22-23 @ 08:26) (18 - 18)  SpO2: 100% (05-22-23 @ 08:26) (99% - 100%)  HEENT:  Head is normocephalic    Skin:  Warm and dry without any rash   NECK:  Supple without lymphadenopathy.   HEART:  Regular rate and rhythm. normal S1 and S2, No M/R/G  LUNGS:  Good ins/exp effort, no W/R/R/C  ABDOMEN:  Soft, nontender, nondistended with good bowel sounds heard  EXTREMITIES:  Without cyanosis, clubbing or edema.   NEUROLOGICAL:  Gross nonfocal       "Pt is a pleasant  52 y/o female with PMHx of Charcot- Yusra -Tooth Disease 1, Dysautonomia,  arrhythmia (PVCs), Esophageal stricture and balloon dilatation, reported inability to swallow pills, elevated pancreatic enzymes currently on ketogenic diet for treatment of dysautonomia, who has been under work-up for tunnel vision and blurry vision. Pt presented via ambulance to [place] [facility] after two syncopal events.    Pt reports having severe right chest and shoulder pain that  woke her up at 5 AM.    She went to the bathroom, where she urinated and had a bowel movement.  Pt reports  she suddenly felt nauseous  and her  reports she then slumped over and had  LOC while on the toilet.  She  landed on her forehead and right arm.   Pt later felt lightheaded and nauseated , with dizziness.  She reported not being able to lift her head or walk.      Pt reported  RUQ discomfort,  She denies fever/chills,  no URI complaints.  Pt reports urinary frequency and cloudy urine.  She reports a loose stool/day due to Mag citrate intake.  She reports orange stool while on a current ketogenic diet.  Pt states she had a Strep B urine cx for which she took oil of oregano for 7 days.   She denies sick contacts.     Pt sleeps on an angle due to her dysautonomia.  Patient at this time does not wants to have a ct scan of the chest -  as she has had 10 Ct scans since january. Patient is willing to try trial of steroids.   Elevcated D-dimer in the past workup did not suggest any blood clots as per patient during previous hospitalizations.     Physical Exam:   GENERAL APPEARANCE:  NAD, hemodynamically stable  T(C): 36.8 (05-22-23 @ 08:26), Max: 36.8 (05-22-23 @ 08:26)  HR: 57 (05-22-23 @ 08:26) (57 - 66)  BP: 129/77 (05-22-23 @ 08:26) (117/69 - 129/77)  RR: 18 (05-22-23 @ 08:26) (18 - 18)  SpO2: 100% (05-22-23 @ 08:26) (99% - 100%)  HEENT:  Head is normocephalic    Skin:  Warm and dry without any rash   NECK:  Supple without lymphadenopathy.   HEART:  Regular rate and rhythm. normal S1 and S2, No M/R/G  LUNGS:  Good ins/exp effort, no W/R/R/C  ABDOMEN:  Soft, nontender, nondistended with good bowel sounds heard  EXTREMITIES:  Without cyanosis, clubbing or edema.   NEUROLOGICAL:  Gross nonfocal

## 2023-05-29 DIAGNOSIS — G90.1 FAMILIAL DYSAUTONOMIA [RILEY-DAY]: ICD-10-CM

## 2023-05-29 DIAGNOSIS — H81.13 BENIGN PAROXYSMAL VERTIGO, BILATERAL: ICD-10-CM

## 2023-05-29 DIAGNOSIS — E86.0 DEHYDRATION: ICD-10-CM

## 2024-06-14 ENCOUNTER — NON-APPOINTMENT (OUTPATIENT)
Age: 54
End: 2024-06-14